# Patient Record
Sex: MALE | Race: WHITE | Employment: OTHER | URBAN - METROPOLITAN AREA
[De-identification: names, ages, dates, MRNs, and addresses within clinical notes are randomized per-mention and may not be internally consistent; named-entity substitution may affect disease eponyms.]

---

## 2018-05-03 ENCOUNTER — HOSPITAL ENCOUNTER (OUTPATIENT)
Dept: LAB | Age: 73
Discharge: HOME | End: 2018-05-03

## 2018-05-03 PROCEDURE — 88305 TISSUE EXAM BY PATHOLOGIST: CPT | Performed by: INTERNAL MEDICINE

## 2021-12-12 ENCOUNTER — APPOINTMENT (OUTPATIENT)
Dept: CT IMAGING | Age: 76
End: 2021-12-12
Payer: MEDICARE

## 2021-12-12 ENCOUNTER — HOSPITAL ENCOUNTER (EMERGENCY)
Age: 76
Discharge: HOME | End: 2021-12-12
Attending: EMERGENCY MEDICINE
Payer: MEDICARE

## 2021-12-12 VITALS
WEIGHT: 192.6 LBS | HEART RATE: 75 BPM | BODY MASS INDEX: 28.53 KG/M2 | SYSTOLIC BLOOD PRESSURE: 156 MMHG | HEIGHT: 69 IN | DIASTOLIC BLOOD PRESSURE: 70 MMHG | OXYGEN SATURATION: 99 % | RESPIRATION RATE: 18 BRPM | TEMPERATURE: 98 F

## 2021-12-12 DIAGNOSIS — R31.0 GROSS HEMATURIA: Primary | ICD-10-CM

## 2021-12-12 LAB
ALBUMIN SERPL-MCNC: 3.4 G/DL (ref 3.2–4.6)
ALBUMIN/GLOB SERPL: 0.9 {RATIO} (ref 1.2–3.5)
ALP SERPL-CCNC: 79 U/L (ref 50–136)
ALT SERPL-CCNC: 22 U/L (ref 12–65)
ANION GAP SERPL CALC-SCNC: 6 MMOL/L (ref 7–16)
APPEARANCE UR: ABNORMAL
AST SERPL-CCNC: 18 U/L (ref 15–37)
BACTERIA URNS QL MICRO: 0 /HPF
BILIRUB SERPL-MCNC: 0.4 MG/DL (ref 0.2–1.1)
BILIRUB UR QL: ABNORMAL
BUN SERPL-MCNC: 17 MG/DL (ref 8–23)
CALCIUM SERPL-MCNC: 9.1 MG/DL (ref 8.3–10.4)
CHLORIDE SERPL-SCNC: 105 MMOL/L (ref 98–107)
CO2 SERPL-SCNC: 28 MMOL/L (ref 21–32)
COLOR UR: ABNORMAL
CREAT SERPL-MCNC: 0.86 MG/DL (ref 0.8–1.5)
ERYTHROCYTE [DISTWIDTH] IN BLOOD BY AUTOMATED COUNT: 12.7 % (ref 11.9–14.6)
GLOBULIN SER CALC-MCNC: 3.9 G/DL (ref 2.3–3.5)
GLUCOSE SERPL-MCNC: 100 MG/DL (ref 65–100)
GLUCOSE UR STRIP.AUTO-MCNC: 100 MG/DL
HCT VFR BLD AUTO: 42.7 % (ref 41.1–50.3)
HGB BLD-MCNC: 14 G/DL (ref 13.6–17.2)
HGB UR QL STRIP: ABNORMAL
KETONES UR QL STRIP.AUTO: 40 MG/DL
LEUKOCYTE ESTERASE UR QL STRIP.AUTO: ABNORMAL
MCH RBC QN AUTO: 31.9 PG (ref 26.1–32.9)
MCHC RBC AUTO-ENTMCNC: 32.8 G/DL (ref 31.4–35)
MCV RBC AUTO: 97.3 FL (ref 79.6–97.8)
NITRITE UR QL STRIP.AUTO: POSITIVE
NRBC # BLD: 0 K/UL (ref 0–0.2)
OTHER OBSERVATIONS,UCOM: ABNORMAL
PH UR STRIP: 8.5 [PH] (ref 5–9)
PLATELET # BLD AUTO: 210 K/UL (ref 150–450)
PMV BLD AUTO: 8.9 FL (ref 9.4–12.3)
POTASSIUM SERPL-SCNC: 3.7 MMOL/L (ref 3.5–5.1)
PROT SERPL-MCNC: 7.3 G/DL (ref 6.3–8.2)
PROT UR STRIP-MCNC: ABNORMAL MG/DL
RBC # BLD AUTO: 4.39 M/UL (ref 4.23–5.6)
RBC #/AREA URNS HPF: >100 /HPF
SODIUM SERPL-SCNC: 139 MMOL/L (ref 136–145)
SP GR UR REFRACTOMETRY: 1.01 (ref 1–1.02)
UROBILINOGEN UR QL STRIP.AUTO: 4 EU/DL (ref 0.2–1)
WBC # BLD AUTO: 4.8 K/UL (ref 4.3–11.1)
WBC URNS QL MICRO: ABNORMAL /HPF

## 2021-12-12 PROCEDURE — 85027 COMPLETE CBC AUTOMATED: CPT

## 2021-12-12 PROCEDURE — 74177 CT ABD & PELVIS W/CONTRAST: CPT

## 2021-12-12 PROCEDURE — 99283 EMERGENCY DEPT VISIT LOW MDM: CPT

## 2021-12-12 PROCEDURE — 51798 US URINE CAPACITY MEASURE: CPT

## 2021-12-12 PROCEDURE — 74011000258 HC RX REV CODE- 258: Performed by: EMERGENCY MEDICINE

## 2021-12-12 PROCEDURE — 74011000636 HC RX REV CODE- 636: Performed by: EMERGENCY MEDICINE

## 2021-12-12 PROCEDURE — 80053 COMPREHEN METABOLIC PANEL: CPT

## 2021-12-12 PROCEDURE — 81003 URINALYSIS AUTO W/O SCOPE: CPT

## 2021-12-12 RX ORDER — SODIUM CHLORIDE 0.9 % (FLUSH) 0.9 %
5-40 SYRINGE (ML) INJECTION AS NEEDED
Status: DISCONTINUED | OUTPATIENT
Start: 2021-12-12 | End: 2021-12-13 | Stop reason: HOSPADM

## 2021-12-12 RX ORDER — TAMSULOSIN HYDROCHLORIDE 0.4 MG/1
0.4 CAPSULE ORAL DAILY
Qty: 15 CAPSULE | Refills: 0 | Status: SHIPPED | OUTPATIENT
Start: 2021-12-12 | End: 2021-12-27

## 2021-12-12 RX ORDER — SODIUM CHLORIDE 0.9 % (FLUSH) 0.9 %
10 SYRINGE (ML) INJECTION
Status: COMPLETED | OUTPATIENT
Start: 2021-12-12 | End: 2021-12-12

## 2021-12-12 RX ORDER — SODIUM CHLORIDE 0.9 % (FLUSH) 0.9 %
5-40 SYRINGE (ML) INJECTION EVERY 8 HOURS
Status: DISCONTINUED | OUTPATIENT
Start: 2021-12-12 | End: 2021-12-13 | Stop reason: HOSPADM

## 2021-12-12 RX ADMIN — Medication 10 ML: at 20:24

## 2021-12-12 RX ADMIN — IOPAMIDOL 100 ML: 755 INJECTION, SOLUTION INTRAVENOUS at 20:24

## 2021-12-12 RX ADMIN — SODIUM CHLORIDE 100 ML: 9 INJECTION, SOLUTION INTRAVENOUS at 20:22

## 2021-12-12 NOTE — ED PROVIDER NOTES
Mr. Treva Adrian is a 81-year-old male with a history of prostate cancer, and hyperlipidemia who presents to the emergency department with chief complaint of painless hematuria this morning. Patient states that this happened 1 time before 1 month ago after taking 1 meloxicam per day for 3 days for arthritis. Patient states that this morning he had bright red blood in his urine. Dates that he took an ibuprofen 1 time per day for the past 3 days. Denies dysuria, fever, flank pain, bladder pain, edema, melena, hematochezia, hemoptysis. Endorses one instance of urinary hesitancy today. The history is provided by the patient. No  was used. Blood in Urine  This is a recurrent problem. The current episode started more than 1 week ago. The problem occurs daily. The problem has not changed since onset. Pertinent negatives include no chest pain, no abdominal pain, no headaches and no shortness of breath. Nothing aggravates the symptoms. Nothing relieves the symptoms. Past Medical History:   Diagnosis Date    Arthritis     OA    GERD (gastroesophageal reflux disease)     takes a rare tums    Inguinal hernia     right    Mixed hyperlipidemia     Prostate cancer ([facility] [address] 3/2008    prostate    Skin cancer, basal cell     skin basal cell, shoulder and back       Past Surgical History:   Procedure Laterality Date    HX HERNIA REPAIR  6/4/2013    HX PROSTATECTOMY  2008    followed by radiation in 2012         Family History:   Problem Relation Age of Onset    Stroke Mother     Heart Disease Father        Social History     Socioeconomic History    Marital status:      Spouse name: Not on file    Number of children: Not on file    Years of education: Not on file    Highest education level: Not on file   Occupational History    Not on file   Tobacco Use    Smoking status: Never Smoker    Smokeless tobacco: Never Used   Substance and Sexual Activity    Alcohol use:  Yes Alcohol/week: 5.0 - 6.0 standard drinks     Types: 5 - 6 Standard drinks or equivalent per week    Drug use: No    Sexual activity: Not on file   Other Topics Concern    Not on file   Social History Narrative    Not on file     Social Determinants of Health     Financial Resource Strain:     Difficulty of Paying Living Expenses: Not on file   Food Insecurity:     Worried About Running Out of Food in the Last Year: Not on file    Tamir of Food in the Last Year: Not on file   Transportation Needs:     Lack of Transportation (Medical): Not on file    Lack of Transportation (Non-Medical): Not on file   Physical Activity:     Days of Exercise per Week: Not on file    Minutes of Exercise per Session: Not on file   Stress:     Feeling of Stress : Not on file   Social Connections:     Frequency of Communication with Friends and Family: Not on file    Frequency of Social Gatherings with Friends and Family: Not on file    Attends [employer] Services: Not on file    Active Member of [address] [place] [address] [email or web address] or Organizations: Not on file    Attends Club or Organization Meetings: Not on file    Marital Status: Not on file   Intimate Partner Violence:     Fear of Current or Ex-Partner: Not on file    Emotionally Abused: Not on file    Physically Abused: Not on file    Sexually Abused: Not on file   Housing Stability:     Unable to Pay for Housing in the Last Year: Not on file    Number of [place] in the Last Year: Not on file    Unstable Housing in the Last Year: Not on file         ALLERGIES: Patient has no known allergies. Review of Systems   Constitutional: Negative for chills, diaphoresis, fatigue and fever. Respiratory: Negative for cough and shortness of breath. Cardiovascular: Negative. Negative for chest pain and leg swelling. Gastrointestinal: Negative for abdominal pain, blood in stool, constipation, diarrhea, nausea and vomiting. Genitourinary: Positive for decreased urine volume and hematuria. Negative for dysuria, enuresis, flank pain, frequency, penile pain and urgency. Neurological: Negative for dizziness, weakness and headaches. All other systems reviewed and are negative. Vitals:    12/12/21 1824   BP: (!) 169/77   Pulse: 71   Resp: 18   Temp: 98 °F (36.7 °C)   SpO2: 99%   Weight: 87.4 kg (192 lb 9.6 oz)   Height: 5' 9\" (1.753 m)            Physical Exam  Vitals and nursing note reviewed. Constitutional:       General: He is not in acute distress. Appearance: Normal appearance. He is normal weight. He is not ill-appearing, toxic-appearing or diaphoretic. HENT:      Head: Normocephalic and atraumatic. Mouth/Throat:      Mouth: Mucous membranes are moist.      Pharynx: No oropharyngeal exudate or posterior oropharyngeal erythema. Eyes:      General: No scleral icterus. Extraocular Movements: Extraocular movements intact. Pupils: Pupils are equal, round, and reactive to light. Cardiovascular:      Rate and Rhythm: Normal rate and regular rhythm. Pulses: Normal pulses. Heart sounds: Normal heart sounds. Pulmonary:      Effort: Pulmonary effort is normal.      Breath sounds: Normal breath sounds. Abdominal:      General: Abdomen is flat. Bowel sounds are normal.      Palpations: Abdomen is soft. Tenderness: There is no abdominal tenderness. There is no right CVA tenderness, left CVA tenderness, guarding or rebound. Musculoskeletal:      Cervical back: Neck supple. Right lower leg: No edema. Left lower leg: No edema. Skin:     General: Skin is warm and dry. Coloration: Skin is not jaundiced or pale. Findings: No bruising, erythema or rash. Neurological:      Mental Status: He is alert and oriented to person, place, and time. Motor: No weakness.    Psychiatric:         Mood and Affect: Mood normal.          MDM  Number of Diagnoses or Management Options  Gross hematuria  Diagnosis management comments: 19-year-old male with history of prostate cancer presenting for painless hematuria today. Patient states he has had one other instance of painless hematuria 1 month ago, did not seek medical attention. Vital signs reviewed. Patient stable. Blood pressure elevated but patient is asymptomatic. Sitting on the edge of the bed comfortably. DDX: Bladder carcinoma, cystitis, interstitial nephritis, renal cell carcinoma, renal calculi, transitional cell carcinoma,hemolytic anemia. Urinalysis, CBC, CMP labs ordered. Labs Reviewed  URINALYSIS W/ RFLX MICROSCOPIC - Abnormal; Notable for the following components:     Protein                         (*)                  Glucose                       100 (*)                Ketone                        40 (*)                 Bilirubin                     LARGE (*)               Blood                         LARGE (*)               Urobilinogen                  4.0 (*)                Nitrites                      Positive (*)               Leukocyte Esterase            LARGE (*)            All other components within normal limits  CBC W/O DIFF - Abnormal; Notable for the following components:     MPV                           8.9 (*)             All other components within normal limits  METABOLIC PANEL, COMPREHENSIVE - Abnormal; Notable for the following components:     Anion gap                     6 (*)                  Globulin                      3.9 (*)                A-G Ratio                     0.9 (*)             All other components within normal limits    Physical exam benign. Based on history and physical, I feel a CT abdomen pelvis with IV contrast is warranted at this time. At 2100 patient was able to urinate. Patient stated he had difficulty getting urine stream started, but stream seemed normal.  Patient states he visualized some clots in his urine. Nurses doing a bladder scan to assess urine retention in the bladder. .    Pending CT abdomen pelvis reading by the radiologist, I will contact the PA for urology via [employer] consult/referral.   Perfect served Dr. Liborio Shea with urology with detailed history and CT report. Dr. Kaleb Reynolds office will contact the patient for follow-up in the office. She is not on any blood thinners at this time that need to be held. Additionally incidental finding of gastric pylorus intussusception in the proximal duodenum on CT scan. Patient currently not having any upper abdominal complaints, tenderness, vomiting. We will have patient follow-up with GI for direct visualization. Given patient strict return to ER instructions. Patient also discharged with a prescription for Flomax    Radiologist called report to me personally. Patient discharge delayed due to backup in radiology and radiology reading. Vane Springer; 12/12/2021 @10:34 PM Voice dictation software was used during the making of this note. This software is not perfect and grammatical and other typographical errors may be present. This note has not been proofread for errors.  ===================================================================         Amount and/or Complexity of Data Reviewed  Clinical lab tests: ordered and reviewed  Tests in the radiology section of CPT®: ordered and reviewed  Review and summarize past medical records: yes  Discuss the patient with other providers: yes (Discussed this patient with the radiologist via phone. Discussed this patient with urology via perfect serve)  Independent visualization of images, tracings, or specimens: yes    Risk of Complications, Morbidity, and/or Mortality  Presenting problems: high  Diagnostic procedures: high  Management options: high  General comments: Patient will be discharged home with strict return to the emergency department instructions. CT image findings reported from radiologist directly to me via telephone. Urology notified over perfect dee, Dr. Kaleb Reynolds office will contact patient. Expressed importance to the patient that he need to follow-up with urology even if he is no longer having blood in his urine. Conveyed  importance that he follow-up with GI. Patient Progress  Patient progress: stable    ED Course as of 12/12/21 2228   Sun Dec 12, 2021   2059 Post void bladder scan bedside: Average 75 mL. [JG]   2107 Patient's blood pressure: 152/72. Patient stable. [JG]   2227 CT ABD PELV W CONT  FINDINGS:     Lung Bases: Partially imaged lung bases are clear. No pericardial effusion.     Liver: Unremarkable.     Gallbladder/Biliary: Gallbladder contracted otherwise unremarkable. No  intrahepatic or extrahepatic ductal dilatation.     Pancreas: Normal. No main ductal dilatation.     Spleen: Normal     Adrenal glands: Normal     Kidneys/Ureters: Symmetric bilateral enhancement. No hydronephrosis. No  suspicious lesions.        Bladder: Hyperattenuating material and/or soft tissue within the dependent  bladder.     Reproduction:Post prostatectomy. No soft tissue attenuation within the resection  bed.     Bowel: Mild gastric distention with intussusception of the gastric pylorus into  the proximal duodenum. Small and large bowel are normal caliber.     Other: No enlarged adenopathy or ascites.     Vessels: Atherosclerotic calcifications without significant vessel narrowing.     Musculoskeletal: No acute or aggressive osseous abnormalities. The soft tissues  are unremarkable.     IMPRESSION  1. Hyperattenuating material and/or soft tissue within the dependent bladder  which should be correlated with direct visualization.     2.  Mild gastric distention with intussusception of the gastric pylorus into the  proximal duodenum without liliane outlet obstruction which may be incidental  finding. Although no discrete mass is visualized cannot exclude a possible lead  point, recommend GI follow-up for possible direct visualization with endoscopy.  [JG]      ED Course User Index  [JG] Vane Blair Procedures

## 2021-12-13 NOTE — ED NOTES
I have reviewed discharge instructions with the patient. The patient verbalized understanding. Patient left ED via Discharge Method: ambulatory to Home with self. Opportunity for questions and clarification provided. Patient given 0 scripts. To continue your aftercare when you leave the hospital, you may receive an automated call from our care team to check in on how you are doing. This is a free service and part of our promise to provide the best care and service to meet your aftercare needs.  If you have questions, or wish to unsubscribe from this service please call [phone]. Thank you for Choosing our [facility] Emergency Department.

## 2021-12-13 NOTE — DISCHARGE INSTRUCTIONS
Should receive a phone call from urology within the next day or 2. Not hear from urology call their office information provided in the discharge papers. Is important that you follow-up with the urologist even if you stop having blood in your urine. You need to be evaluated by them. Prompt return to the emergency department you are unable to urinate, have bladder or pelvic pain, fever or flank pain, or any of the symptoms that we discussed. Do not take NSAIDs for your arthritic pain. Switch to Tylenol. You had an incidental finding on your CAT scan requires a follow-up with GI for direct visualization.   Return to the emergency department if you have abdominal pain, vomiting,

## 2022-01-07 PROCEDURE — 88112 CYTOPATH CELL ENHANCE TECH: CPT

## 2022-01-10 ENCOUNTER — HOSPITAL ENCOUNTER (OUTPATIENT)
Dept: LAB | Age: 77
Discharge: HOME | End: 2022-01-10

## 2022-01-11 NOTE — PROGRESS NOTES
Mr. Osmar Hightower, your urine cytology showed NO cancer cells. We will leave your follow up open ended for now. Please call me if the blood in your urine returns. Thanks!   Dr. Johann Higgins

## 2022-02-09 ENCOUNTER — ANESTHESIA EVENT (OUTPATIENT)
Dept: SURGERY | Age: 77
End: 2022-02-09
Payer: MEDICARE

## 2022-02-10 ENCOUNTER — HOSPITAL ENCOUNTER (OUTPATIENT)
Age: 77
Setting detail: OUTPATIENT SURGERY
Discharge: HOME | End: 2022-02-10
Attending: ORTHOPAEDIC SURGERY | Admitting: ORTHOPAEDIC SURGERY
Payer: MEDICARE

## 2022-02-10 ENCOUNTER — ANESTHESIA (OUTPATIENT)
Dept: SURGERY | Age: 77
End: 2022-02-10
Payer: MEDICARE

## 2022-02-10 VITALS
RESPIRATION RATE: 16 BRPM | BODY MASS INDEX: 28.06 KG/M2 | OXYGEN SATURATION: 100 % | TEMPERATURE: 98.1 F | DIASTOLIC BLOOD PRESSURE: 71 MMHG | HEART RATE: 60 BPM | WEIGHT: 190 LBS | SYSTOLIC BLOOD PRESSURE: 160 MMHG

## 2022-02-10 DIAGNOSIS — G89.18 ACUTE POST-OPERATIVE PAIN: Primary | ICD-10-CM

## 2022-02-10 PROCEDURE — 28289 CORRJ HALUX RIGDUS W/O IMPLT: CPT | Performed by: ORTHOPAEDIC SURGERY

## 2022-02-10 PROCEDURE — 74011250636 HC RX REV CODE- 250/636: Performed by: NURSE PRACTITIONER

## 2022-02-10 PROCEDURE — 76060000032 HC ANESTHESIA 0.5 TO 1 HR: Performed by: ORTHOPAEDIC SURGERY

## 2022-02-10 PROCEDURE — 77030040922 HC BLNKT HYPOTHRM STRY -A: Performed by: ANESTHESIOLOGY

## 2022-02-10 PROCEDURE — 76010000154 HC OR TIME FIRST 0.5 HR: Performed by: ORTHOPAEDIC SURGERY

## 2022-02-10 PROCEDURE — 74011250636 HC RX REV CODE- 250/636: Performed by: NURSE ANESTHETIST, CERTIFIED REGISTERED

## 2022-02-10 PROCEDURE — 77030000032 HC CUF TRNQT ZIMM -B: Performed by: ORTHOPAEDIC SURGERY

## 2022-02-10 PROCEDURE — 76210000063 HC OR PH I REC FIRST 0.5 HR: Performed by: ORTHOPAEDIC SURGERY

## 2022-02-10 PROCEDURE — 74011000250 HC RX REV CODE- 250: Performed by: ORTHOPAEDIC SURGERY

## 2022-02-10 PROCEDURE — 28289 CORRJ HALUX RIGDUS W/O IMPLT: CPT | Performed by: NURSE PRACTITIONER

## 2022-02-10 PROCEDURE — 74011250637 HC RX REV CODE- 250/637: Performed by: ANESTHESIOLOGY

## 2022-02-10 PROCEDURE — 74011000250 HC RX REV CODE- 250: Performed by: NURSE ANESTHETIST, CERTIFIED REGISTERED

## 2022-02-10 PROCEDURE — 76210000021 HC REC RM PH II 0.5 TO 1 HR: Performed by: ORTHOPAEDIC SURGERY

## 2022-02-10 PROCEDURE — 74011250636 HC RX REV CODE- 250/636: Performed by: ANESTHESIOLOGY

## 2022-02-10 PROCEDURE — 77030002982 HC SUT POLYSRB J&J -A: Performed by: ORTHOPAEDIC SURGERY

## 2022-02-10 PROCEDURE — 77030006788 HC BLD SAW OSC STRY -B: Performed by: ORTHOPAEDIC SURGERY

## 2022-02-10 PROCEDURE — 2709999900 HC NON-CHARGEABLE SUPPLY: Performed by: ORTHOPAEDIC SURGERY

## 2022-02-10 PROCEDURE — 77030002916 HC SUT ETHLN J&J -A: Performed by: ORTHOPAEDIC SURGERY

## 2022-02-10 RX ORDER — SODIUM CHLORIDE, SODIUM LACTATE, POTASSIUM CHLORIDE, CALCIUM CHLORIDE 600; 310; 30; 20 MG/100ML; MG/100ML; MG/100ML; MG/100ML
75 INJECTION, SOLUTION INTRAVENOUS CONTINUOUS
Status: DISCONTINUED | OUTPATIENT
Start: 2022-02-10 | End: 2022-02-10 | Stop reason: HOSPADM

## 2022-02-10 RX ORDER — MIDAZOLAM HYDROCHLORIDE 1 MG/ML
4 INJECTION, SOLUTION INTRAMUSCULAR; INTRAVENOUS ONCE
Status: DISCONTINUED | OUTPATIENT
Start: 2022-02-10 | End: 2022-02-10 | Stop reason: HOSPADM

## 2022-02-10 RX ORDER — LIDOCAINE HYDROCHLORIDE 10 MG/ML
0.1 INJECTION INFILTRATION; PERINEURAL AS NEEDED
Status: DISCONTINUED | OUTPATIENT
Start: 2022-02-10 | End: 2022-02-10 | Stop reason: HOSPADM

## 2022-02-10 RX ORDER — MIDAZOLAM HYDROCHLORIDE 1 MG/ML
2 INJECTION, SOLUTION INTRAMUSCULAR; INTRAVENOUS
Status: DISCONTINUED | OUTPATIENT
Start: 2022-02-10 | End: 2022-02-10 | Stop reason: HOSPADM

## 2022-02-10 RX ORDER — CEFAZOLIN SODIUM/WATER 2 G/20 ML
2 SYRINGE (ML) INTRAVENOUS ONCE
Status: COMPLETED | OUTPATIENT
Start: 2022-02-10 | End: 2022-02-10

## 2022-02-10 RX ORDER — PROPOFOL 10 MG/ML
INJECTION, EMULSION INTRAVENOUS AS NEEDED
Status: DISCONTINUED | OUTPATIENT
Start: 2022-02-10 | End: 2022-02-10 | Stop reason: HOSPADM

## 2022-02-10 RX ORDER — HYDROCODONE BITARTRATE AND ACETAMINOPHEN 5; 325 MG/1; MG/1
2 TABLET ORAL AS NEEDED
Status: DISCONTINUED | OUTPATIENT
Start: 2022-02-10 | End: 2022-02-10 | Stop reason: HOSPADM

## 2022-02-10 RX ORDER — LIDOCAINE HYDROCHLORIDE 20 MG/ML
INJECTION, SOLUTION EPIDURAL; INFILTRATION; INTRACAUDAL; PERINEURAL AS NEEDED
Status: DISCONTINUED | OUTPATIENT
Start: 2022-02-10 | End: 2022-02-10 | Stop reason: HOSPADM

## 2022-02-10 RX ORDER — FENTANYL CITRATE 50 UG/ML
100 INJECTION, SOLUTION INTRAMUSCULAR; INTRAVENOUS ONCE
Status: DISCONTINUED | OUTPATIENT
Start: 2022-02-10 | End: 2022-02-10 | Stop reason: HOSPADM

## 2022-02-10 RX ORDER — SODIUM CHLORIDE 0.9 % (FLUSH) 0.9 %
5-40 SYRINGE (ML) INJECTION AS NEEDED
Status: DISCONTINUED | OUTPATIENT
Start: 2022-02-10 | End: 2022-02-10 | Stop reason: HOSPADM

## 2022-02-10 RX ORDER — OXYCODONE HYDROCHLORIDE 5 MG/1
5 TABLET ORAL
Qty: 20 TABLET | Refills: 0 | Status: SHIPPED | OUTPATIENT
Start: 2022-02-10 | End: 2022-02-15

## 2022-02-10 RX ORDER — HYDROMORPHONE HYDROCHLORIDE 2 MG/ML
0.5 INJECTION, SOLUTION INTRAMUSCULAR; INTRAVENOUS; SUBCUTANEOUS
Status: DISCONTINUED | OUTPATIENT
Start: 2022-02-10 | End: 2022-02-10 | Stop reason: HOSPADM

## 2022-02-10 RX ORDER — BUPIVACAINE HYDROCHLORIDE 5 MG/ML
INJECTION, SOLUTION EPIDURAL; INTRACAUDAL AS NEEDED
Status: DISCONTINUED | OUTPATIENT
Start: 2022-02-10 | End: 2022-02-10 | Stop reason: HOSPADM

## 2022-02-10 RX ORDER — CEPHALEXIN 500 MG/1
500 CAPSULE ORAL 4 TIMES DAILY
Qty: 12 CAPSULE | Refills: 0 | Status: SHIPPED | OUTPATIENT
Start: 2022-02-10

## 2022-02-10 RX ORDER — SODIUM CHLORIDE 0.9 % (FLUSH) 0.9 %
5-40 SYRINGE (ML) INJECTION EVERY 8 HOURS
Status: DISCONTINUED | OUTPATIENT
Start: 2022-02-10 | End: 2022-02-10 | Stop reason: HOSPADM

## 2022-02-10 RX ORDER — PROPOFOL 10 MG/ML
INJECTION, EMULSION INTRAVENOUS
Status: DISCONTINUED | OUTPATIENT
Start: 2022-02-10 | End: 2022-02-10 | Stop reason: HOSPADM

## 2022-02-10 RX ORDER — OXYCODONE HYDROCHLORIDE 5 MG/1
5 TABLET ORAL
Status: DISCONTINUED | OUTPATIENT
Start: 2022-02-10 | End: 2022-02-10 | Stop reason: HOSPADM

## 2022-02-10 RX ADMIN — PROPOFOL 40 MG: 10 INJECTION, EMULSION INTRAVENOUS at 07:46

## 2022-02-10 RX ADMIN — LIDOCAINE HYDROCHLORIDE 40 MG: 20 INJECTION, SOLUTION EPIDURAL; INFILTRATION; INTRACAUDAL; PERINEURAL at 07:42

## 2022-02-10 RX ADMIN — SODIUM CHLORIDE, SODIUM LACTATE, POTASSIUM CHLORIDE, AND CALCIUM CHLORIDE 75 ML/HR: 600; 310; 30; 20 INJECTION, SOLUTION INTRAVENOUS at 06:14

## 2022-02-10 RX ADMIN — PROPOFOL 50 MG: 10 INJECTION, EMULSION INTRAVENOUS at 07:42

## 2022-02-10 RX ADMIN — PROPOFOL 100 MCG/KG/MIN: 10 INJECTION, EMULSION INTRAVENOUS at 07:44

## 2022-02-10 RX ADMIN — HYDROCODONE BITARTRATE AND ACETAMINOPHEN 2 TABLET: 5; 325 TABLET ORAL at 08:18

## 2022-02-10 RX ADMIN — Medication 2 G: at 07:32

## 2022-02-10 NOTE — ANESTHESIA PREPROCEDURE EVALUATION
Anesthetic History   No history of anesthetic complications            Review of Systems / Medical History  Patient summary reviewed and pertinent labs reviewed    Pulmonary  Within defined limits                 Neuro/Psych   Within defined limits           Cardiovascular              Hyperlipidemia    Exercise tolerance: >4 METS     GI/Hepatic/Renal     GERD: well controlled           Endo/Other        Arthritis and cancer (prostate)     Other Findings   Comments:            Physical Exam    Airway  Mallampati: II  TM Distance: 4 - 6 cm  Neck ROM: normal range of motion   Mouth opening: Normal     Cardiovascular  Regular rate and rhythm,  S1 and S2 normal,  no murmur, click, rub, or gallop             Dental    Dentition: Implants     Pulmonary  Breath sounds clear to auscultation               Abdominal  GI exam deferred       Other Findings            Anesthetic Plan    ASA: 2  Anesthesia type: total IV anesthesia and general - backup          Induction: Intravenous  Anesthetic plan and risks discussed with: Patient and Spouse      No block

## 2022-02-10 NOTE — OP NOTES
FULL OP NOTE    PATIENT NAME: Murray Christina  MRN: [id number]    DATE OF SURGERY: 2/10/2022    PREOPERATIVE DIAGNOSIS: Right foot pain [M79.671]  Hallux rigidus of right foot [M20.21]      POSTOPERATIVE DIAGNOSIS: Right foot pain [M79.671]  Hallux rigidus of right foot [M20.21]      PROCEDURE: Right first MTP cheilectomy, 79541    SURGEON: Kristie Salazar MD    ASSISTANT: Dorita Ríos NP  An assistant was required for positioning, retraction, and wound closure for this procedure. HARDWARE: * No implants in log *  INDICATIONS: This patient is a 78y.o. year old male with a history of Right foot pain [M79.671]  Hallux rigidus of right foot [M20.21] who has failed conservative therapy and desires surgical treatment. Risks and benefits of the procedure including, but not limited to, anesthetic complications as well as surgical complications including damage to nerves and blood vessels, risk of infection, risk of incomplete pain relief, risk of malunion, nonunion and need for additional surgery have been discussed with the patient who wishes to proceed. PROCEDURE IN DETAIL: A time out was done to confirm the operating procedure, surgeon, patient and site. During a preop surgical timeout the right lower extremity was identified as correct surgical site and prepped and draped in a standard sterile fashion using ChloraPrep solution. A field block was obtained with 0.5% plain Marcaine. A dorsal approach the first MTP joint was an open followed by capsulotomy. Osteophytes removed from the base the proximal phalanx using a rongeur. A sagittal saw was used to remove osteophytes from the first metatarsal head as well. Both gutters were decompressed using a rongeur of synovitis and bone spurs. The wound was then irrigated and closed using Vicryl and the capsule followed by Monocryl nylon sutures on the skin. Sterile dressings were applied. Anesthesia was continued.   The patient was transferred back to recovery bed.  He was taken to recovery in satisfactory condition. Appear to tolerate the procedure well. There were no apparent surgical or anesthetic complications. All needle and sponge counts were correct. TOURNIQUET TIME: Approx 12 minutes. SPECIMENS: none    ESTIMATED BLOOD LOSS: min mL.

## 2022-02-10 NOTE — DISCHARGE INSTRUCTIONS
INSTRUCTIONS FOLLOWING FOOT SURGERY    ACTIVITY  Elevate foot. No Ice    Protected partial weight bearing on the heel only as tolerated in post op shoe after full sensation returns. Blood clot prevention:  As instructed by Dr Horacio Souza: Take one 325mg aspirin daily if okay with your medical doctor and you have no GI ulcer. Get up and out of bed frequently. While in bed move the legs as much as possible. DRESSING CARE Keep dry and in place until follow up appointment. Cover with cast bag or plastic bag when showering. Let the office know if dressing gets saturated with water. Don't put anything into the splint to relieve itching etc.     CALL YOUR DOCTOR IF YOU HAVE  Excessive bleeding that does not stop after holding mild pressure over the area. Temperature of 101 degrees or above. Redness, excessive swelling or bruising, and/or green or yellow, smelly discharge from incision. Loss of sensation - cold, white or blue toes. DIET  Day of Surgery: Clear liquids until no nausea or vomiting; then light, bland diet (Baked chicken, plain rice, grits, scrambled egg, toast). Nothing Greasy, fried or spicy today  Advance to regular diet on second day, unless your doctor orders otherwise. PAIN  Take pain medications as directed by your doctor. Call your doctor if pain is NOT relieved by medication. MEDICATION INTERACTION:  During your procedure you potentially received a medication or medications which may reduce the effectiveness of oral contraceptives. Please consider other forms of contraception for 1 month following your procedure if you are currently using oral contraceptives as your primary form of birth control.  In addition to this, we recommend continuing your oral contraceptive as prescribed, unless otherwise instructed by your physician, during this time    After general anesthesia or intravenous sedation, for 24 hours or while taking prescription Narcotics:  · Limit your activities  · A responsible adult needs to be with you for the next 24 hours  · Do not drive and operate hazardous machinery  · Do not make important personal or business decisions  · Do not drink alcoholic beverages  · If you have not urinated within 8 hours after discharge, and you are experiencing discomfort from urinary retention, please go to the nearest ED. · If you have sleep apnea and have a CPAP machine, please use it for all naps and sleeping. · Please use caution when taking narcotics and any of your home medications that may cause drowsiness. *  Please give a list of your current medications to your Primary Care Provider. *  Please update this list whenever your medications are discontinued, doses are      changed, or new medications (including over-the-counter products) are added. *  Please carry medication information at all times in case of emergency situations. These are general instructions for a healthy lifestyle:  No smoking/ No tobacco products/ Avoid exposure to second hand smoke  Surgeon General's Warning:  Quitting smoking now greatly reduces serious risk to your health. Obesity, smoking, and sedentary lifestyle greatly increases your risk for illness  A healthy diet, regular physical exercise & weight monitoring are important for maintaining a healthy lifestyle    You may be retaining fluid if you have a history of heart failure or if you experience any of the following symptoms:  Weight gain of 3 pounds or more overnight or 5 pounds in a week, increased swelling in our hands or feet or shortness of breath while lying flat in bed. Please call your doctor as soon as you notice any of these symptoms; do not wait until your next office visit. Learning About How to Use Crutches  Your Care Instructions  Crutches can help you walk when you have an injured hip, leg, knee, ankle, or foot. Your doctor will tell you how much weight--if any--you can put on your leg. Be sure your crutches fit you.  When you stand up in your normal posture, there should be space for two or three fingers between the top of the crutch and your armpit. When you let your hands hang down, the hand  should be at your wrists. When you put your hands on the hand , your elbows should be slightly bent. To stay safe when using crutches:  · Look straight ahead, not down at your feet. · Clear away small rugs, cords, or anything else that could cause you to trip, slip, or fall. · Be very careful around pets and small children. They can get in your path when you least expect it. · Be sure the rubber tips on your crutches are clean and in good condition to help prevent slipping. · Avoid slick conditions, such as wet floors and snowy or icy driveways. In bad weather, be especially careful on curbs and steps. How to use crutches  Getting ready to walk    1. Bend your elbows slightly. Press the padded top parts of the crutches against your sides, under your armpits. 2. If you have been told not to put any weight on your injured leg, keep that leg bent and off the ground. Walking with crutches    1. Put both crutches about 12 inches in front of you. 2. Put your weight on the handgrips, not on the pads under your arms. (Constant pressure against your underarms can cause numbness.) Swing your body forward. (If you have been told not to put any weight on your injured leg, keep that leg bent and off the ground.)  3. To complete the step, put your weight on the strong leg. 4. Move your crutches about 12 inches in front of you, and start the next step. 5. When you're confident using the crutches, you can move the crutches and your injured leg at the same time. Then push straight down on the crutches as you step past the crutches with your strong leg, as you would in normal walking. 6. Take small steps. 7. Use ramps and elevators when you can. Sitting down    1. To sit, back up to the chair.  Use one hand to hold both crutches by the handgrips, beside your injured leg. With the other hand, hold onto the seat and slowly lower yourself onto the chair. 2. Lay the crutches on the ground near your chair. If you prop them up, they may fall over. Getting up from a chair    1. To get up from a chair,  the crutches and put them in one hand beside your injured leg. 2. Put your weight on the handgrips of the crutches and on your strong leg to stand up. Walking up stairs    1. To go up stairs, step up with your strong leg and then bring the crutches and your injured leg to the upper step. 2. For stairs that have handrails: Put both crutches under the arm opposite the handrail. Use the hand opposite the handrail to hold both crutches by the handgrips. 3. Hold onto the handrail as you go up. Put your strong leg on the step first when you go up. Walking down stairs    1. To go down stairs, put your crutches and injured leg on the lower step. 2. Bring your strong leg to the lower step. This saying may help you remember: \"Up with the good, down with the bad. \"  3. For stairs that have handrails: Put both crutches under the arm opposite the handrail. Use the hand opposite the handrail to hold both crutches by the handgrips. Hold onto the handrail as you go down. Follow the same process you use for stairs: Lead with your crutches and injured leg on the way down.

## 2022-02-10 NOTE — ANESTHESIA POSTPROCEDURE EVALUATION
Procedure(s):  RIGHT FIRST METATARSOPHALANGEAL CHEILECTOMY . No value filed. Anesthesia Post Evaluation      Multimodal analgesia: multimodal analgesia used between 6 hours prior to anesthesia start to PACU discharge  Patient location during evaluation: PACU  Patient participation: complete - patient participated  Level of consciousness: awake and alert  Pain score: 0  Pain management: satisfactory to patient  Airway patency: patent  Anesthetic complications: no  Cardiovascular status: acceptable and hemodynamically stable  Respiratory status: acceptable and spontaneous ventilation  Hydration status: acceptable  Post anesthesia nausea and vomiting:  none  Final Post Anesthesia Temperature Assessment:  Normothermia (36.0-37.5 degrees C)      INITIAL Post-op Vital signs:   Vitals Value Taken Time   /67 02/10/22 0821   Temp 36.7 °C (98.1 °F) 02/10/22 0811   Pulse 59 02/10/22 0821   Resp 16 02/10/22 0821   SpO2 99 % 02/10/22 0821   Vitals shown include unvalidated device data.

## 2022-02-10 NOTE — H&P
Outpatient Surgery History and Physical:  José Villanueva was seen and examined. CHIEF COMPLAINT:   Right foot. PE:     Visit Vitals  BP (!) 145/78 (BP 1 Location: Right arm, BP Patient Position: Sitting)   Pulse 70   Temp 97.9 °F (36.6 °C)   Resp 18   Wt 190 lb (86.2 kg)   SpO2 97%   BMI 28.06 kg/m²       Heart:   Regular rhythm      Lungs:  Are clear      Past Medical History:    Patient Active Problem List    Diagnosis    Prediabetes    Onychomycosis    Blepharoptosis, mild    LBP (low back pain)    Eyelid twitch    ED (erectile dysfunction)    Arthritis    Neck pain    GERD (gastroesophageal reflux disease)     takes a rare tums      Skin cancer, basal cell     skin basal cell, shoulder and back      Mixed hyperlipidemia    Prostate cancer [facility])     prostate         Surgical History:   Past Surgical History:   Procedure Laterality Date    HX HEENT  01/2022    implant/dental    HX HERNIA REPAIR  6/4/2013    HX PROSTATECTOMY  2008    followed by radiation in 2012       Social History: Patient  reports that he has never smoked. He has never used smokeless tobacco. He reports current alcohol use of about 5.0 - 6.0 standard drinks of alcohol per week. He reports that he does not use drugs. Family History:   Family History   Problem Relation Age of Onset    Stroke Mother     Heart Disease Father        Allergies: Reviewed per EMR  No Known Allergies    Medications:    No current facility-administered medications on file prior to encounter. Current Outpatient Medications on File Prior to Encounter   Medication Sig    DOCOSAHEXANOIC ACID/EPA (FISH OIL PO) Take 1 Tab by mouth daily.  simvastatin (ZOCOR) 20 mg tablet Take 1 Tab by mouth nightly.  GLUCOSAMINE HCL/CHONDR MADRIGAL A NA (GLUCOSAMINE-CHONDROITIN) 750-600 mg tab Take  by mouth.  calcium carbonate (TUMS) 200 mg calcium (500 mg) Chew Take 2 Tabs by mouth as needed.     acetaminophen (TYLENOL EXTRA STRENGTH) 500 mg tablet Take 1,000 mg by mouth every six (6) hours as needed.  tadalafil (CIALIS) 5 mg tablet Take 1 Tab by mouth as needed. The surgery is planned for the RIGHT FIRST METATARSOPHALANGEAL CHEILECTOMY . History and physical has been reviewed. The patient has been examined. There have been no significant clinical changes since the completion of the originally dated History and Physical.  Patient identified by surgeon; surgical site was confirmed by patient and surgeon. The patient is here today for outpatient surgery. I have examined the patient, no changes are noted in the patient's medical status. Necessity for the procedure/care is still present and the history and physical above is current. See the office notes for the full long term history of the problem. Please see the recent office notes for the musculoskeletal examination.     Signed By: Tanja Khan NP     February 10, 2022 6:57 AM

## 2022-02-10 NOTE — BRIEF OP NOTE
Brief Postoperative Note    Patient: Delia Sharma  YOB: 1945  MRN: [id number]    Date of Procedure: 2/10/2022     Pre-Op Diagnosis: Right foot pain [M79.671]  Hallux rigidus of right foot [M20.21]    Post-Op Diagnosis: Same as preoperative diagnosis.       Procedure(s):  RIGHT FIRST METATARSOPHALANGEAL CHEILECTOMY     Surgeon(s):  Carola Cruz MD    Surgical Assistant: Nurse Practitioner: Cozette Min, Noberto Mcardle, RN    Anesthesia: MAC     Estimated Blood Loss (mL): Minimal    Complications: None    Specimens: * No specimens in log *     Implants: * No implants in log *    Drains: * No LDAs found *    Findings:     Electronically Signed by Jacques Salinas MD on 2/10/2022 at 8:07 AM

## 2022-07-13 DIAGNOSIS — M17.11 PRIMARY OSTEOARTHRITIS OF RIGHT KNEE: Primary | ICD-10-CM

## 2022-07-21 DIAGNOSIS — M17.11 PRIMARY OSTEOARTHRITIS OF RIGHT KNEE: ICD-10-CM

## 2022-07-25 RX ORDER — SODIUM CHLORIDE 9 MG/ML
INJECTION, SOLUTION INTRAVENOUS PRN
Status: CANCELLED | OUTPATIENT
Start: 2022-07-25

## 2022-07-25 RX ORDER — SODIUM CHLORIDE 0.9 % (FLUSH) 0.9 %
5-40 SYRINGE (ML) INJECTION PRN
Status: CANCELLED | OUTPATIENT
Start: 2022-07-25

## 2022-07-25 RX ORDER — ACETAMINOPHEN 500 MG
1000 TABLET ORAL ONCE
Status: CANCELLED | OUTPATIENT
Start: 2022-07-25 | End: 2022-07-25

## 2022-07-25 RX ORDER — SODIUM CHLORIDE 0.9 % (FLUSH) 0.9 %
5-40 SYRINGE (ML) INJECTION EVERY 12 HOURS SCHEDULED
Status: CANCELLED | OUTPATIENT
Start: 2022-07-25

## 2022-07-26 ENCOUNTER — TELEPHONE (OUTPATIENT)
Dept: ORTHOPEDIC SURGERY | Age: 77
End: 2022-07-26

## 2022-07-26 ENCOUNTER — HOSPITAL ENCOUNTER (OUTPATIENT)
Dept: REHABILITATION | Age: 77
Discharge: HOME | End: 2022-07-29
Payer: MEDICARE

## 2022-07-26 ENCOUNTER — HOSPITAL ENCOUNTER (OUTPATIENT)
Dept: SURGERY | Age: 77
Discharge: HOME | End: 2022-07-29
Payer: MEDICARE

## 2022-07-26 VITALS
RESPIRATION RATE: 16 BRPM | WEIGHT: 190.4 LBS | SYSTOLIC BLOOD PRESSURE: 135 MMHG | HEART RATE: 64 BPM | BODY MASS INDEX: 28.2 KG/M2 | OXYGEN SATURATION: 98 % | HEIGHT: 69 IN | DIASTOLIC BLOOD PRESSURE: 72 MMHG | TEMPERATURE: 98.1 F

## 2022-07-26 DIAGNOSIS — R06.83 SNORING: ICD-10-CM

## 2022-07-26 DIAGNOSIS — M17.11 ARTHRITIS OF KNEE, RIGHT: Primary | ICD-10-CM

## 2022-07-26 LAB
ANION GAP SERPL CALC-SCNC: 5 MMOL/L (ref 7–16)
APTT PPP: 28.6 SEC (ref 24.1–35.1)
BACTERIA SPEC CULT: NORMAL
BASOPHILS # BLD: 0 K/UL (ref 0–0.2)
BASOPHILS NFR BLD: 1 % (ref 0–2)
BUN SERPL-MCNC: 17 MG/DL (ref 8–23)
CALCIUM SERPL-MCNC: 8.3 MG/DL (ref 8.3–10.4)
CHLORIDE SERPL-SCNC: 106 MMOL/L (ref 98–107)
CO2 SERPL-SCNC: 30 MMOL/L (ref 21–32)
CREAT SERPL-MCNC: 0.96 MG/DL (ref 0.8–1.5)
DIFFERENTIAL METHOD BLD: ABNORMAL
EKG ATRIAL RATE: 59 BPM
EKG DIAGNOSIS: NORMAL
EKG P AXIS: 47 DEGREES
EKG P-R INTERVAL: 150 MS
EKG Q-T INTERVAL: 412 MS
EKG QRS DURATION: 90 MS
EKG QTC CALCULATION (BAZETT): 407 MS
EKG R AXIS: 46 DEGREES
EKG T AXIS: 172 DEGREES
EKG VENTRICULAR RATE: 59 BPM
EOSINOPHIL # BLD: 0.2 K/UL (ref 0–0.8)
EOSINOPHIL NFR BLD: 5 % (ref 0.5–7.8)
ERYTHROCYTE [DISTWIDTH] IN BLOOD BY AUTOMATED COUNT: 12.9 % (ref 11.9–14.6)
EST. AVERAGE GLUCOSE BLD GHB EST-MCNC: 114 MG/DL
GLUCOSE SERPL-MCNC: 102 MG/DL (ref 65–100)
HBA1C MFR BLD: 5.6 % (ref 4.8–5.6)
HCT VFR BLD AUTO: 40.1 % (ref 41.1–50.3)
HGB BLD-MCNC: 13 G/DL (ref 13.6–17.2)
IMM GRANULOCYTES # BLD AUTO: 0 K/UL (ref 0–0.5)
IMM GRANULOCYTES NFR BLD AUTO: 1 % (ref 0–5)
INR PPP: 1
LYMPHOCYTES # BLD: 0.9 K/UL (ref 0.5–4.6)
LYMPHOCYTES NFR BLD: 21 % (ref 13–44)
MCH RBC QN AUTO: 31.5 PG (ref 26.1–32.9)
MCHC RBC AUTO-ENTMCNC: 32.4 G/DL (ref 31.4–35)
MCV RBC AUTO: 97.1 FL (ref 79.6–97.8)
MONOCYTES # BLD: 0.4 K/UL (ref 0.1–1.3)
MONOCYTES NFR BLD: 10 % (ref 4–12)
NEUTS SEG # BLD: 2.6 K/UL (ref 1.7–8.2)
NEUTS SEG NFR BLD: 63 % (ref 43–78)
NRBC # BLD: 0 K/UL (ref 0–0.2)
PLATELET # BLD AUTO: 242 K/UL (ref 150–450)
PMV BLD AUTO: 9.3 FL (ref 9.4–12.3)
POTASSIUM SERPL-SCNC: 4.1 MMOL/L (ref 3.5–5.1)
PROTHROMBIN TIME: 13.9 SEC (ref 12.6–14.5)
RBC # BLD AUTO: 4.13 M/UL (ref 4.23–5.6)
SERVICE CMNT-IMP: NORMAL
SODIUM SERPL-SCNC: 141 MMOL/L (ref 136–145)
WBC # BLD AUTO: 4.2 K/UL (ref 4.3–11.1)

## 2022-07-26 PROCEDURE — 85610 PROTHROMBIN TIME: CPT

## 2022-07-26 PROCEDURE — 85730 THROMBOPLASTIN TIME PARTIAL: CPT

## 2022-07-26 PROCEDURE — 97161 PT EVAL LOW COMPLEX 20 MIN: CPT

## 2022-07-26 PROCEDURE — 80048 BASIC METABOLIC PNL TOTAL CA: CPT

## 2022-07-26 PROCEDURE — 85025 COMPLETE CBC W/AUTO DIFF WBC: CPT

## 2022-07-26 PROCEDURE — 87641 MR-STAPH DNA AMP PROBE: CPT

## 2022-07-26 PROCEDURE — 83036 HEMOGLOBIN GLYCOSYLATED A1C: CPT

## 2022-07-26 PROCEDURE — 94760 N-INVAS EAR/PLS OXIMETRY 1: CPT

## 2022-07-26 RX ORDER — IBUPROFEN 200 MG
200 TABLET ORAL EVERY 6 HOURS PRN
Status: ON HOLD | COMMUNITY
End: 2022-09-14 | Stop reason: HOSPADM

## 2022-07-26 RX ORDER — MELOXICAM 15 MG/1
15 TABLET ORAL AS NEEDED
COMMUNITY

## 2022-07-26 RX ORDER — CHOLECALCIFEROL (VITAMIN D3) 25 MCG
1 CAPSULE ORAL EVERY MORNING
COMMUNITY

## 2022-07-26 RX ORDER — CHLORAL HYDRATE 500 MG
1 CAPSULE ORAL DAILY
Status: ON HOLD | COMMUNITY
End: 2022-09-14 | Stop reason: HOSPADM

## 2022-07-26 ASSESSMENT — KOOS JR
RISING FROM SITTING: 2
STRAIGHTENING KNEE FULLY: 1
STANDING UPRIGHT: 1
HOW SEVERE IS YOUR KNEE STIFFNESS AFTER FIRST WAKING IN MORNING: 2
TWISING OR PIVOTING ON KNEE: 2
GOING UP OR DOWN STAIRS: 2
KOOS JR TOTAL INTERVAL SCORE: 57.14
BENDING TO THE FLOOR TO PICK UP OBJECT: 2

## 2022-07-26 ASSESSMENT — PAIN DESCRIPTION - ORIENTATION: ORIENTATION: LEFT;ANTERIOR;INNER

## 2022-07-26 ASSESSMENT — PAIN SCALES - GENERAL: PAINLEVEL_OUTOF10: 6

## 2022-07-26 ASSESSMENT — PAIN DESCRIPTION - DESCRIPTORS: DESCRIPTORS: SHARP

## 2022-07-26 ASSESSMENT — PAIN DESCRIPTION - LOCATION: LOCATION: KNEE

## 2022-07-26 NOTE — TELEPHONE ENCOUNTER
Kehinde Ahmadi w/ pre adm sf [phone] surg scheduled for 8/8 but at jt camp apt today said he tested positive for covid on 7/16 w/ a mild cough and anesth.  Protocol can't have  surg til at least 8/29/22

## 2022-07-26 NOTE — PROGRESS NOTES
07/26/22 0800   Treatment   Treatment Type Bedside spirometry   Oxygen Therapy/Pulse Ox   O2 Therapy Room air   Heart Rate 64   SpO2 98 %   Pulse Oximeter Device Mode Intermittent   $Pulse Oximeter $Spot check (single)   Bedside Spirometry   FEV-1/Actual (Liters)   (PT HAD COVID WITHIN LAST 2 WEEKS)   Initial respiratory Assessment completed with pt. Pt was interviewed and evaluated in Joint camp prior to surgery. Patient ID:  Kameron Kumar  [id number]  78 y.o.  1945  Surgeon: Dr. Rex Hernandez  Date of Surgery: [email or web address]  Procedure: Total Right Knee Arthroplasty  Primary Care Physician: Marzena Guevara, [address] [place] [address] [phone]  Specialists:     BS:      Pt taught proper COUGH technique  IS REVIEWED WITH PT AS WELL AS BENEFITS OF USING IS IN SEDENTARY PTS.   DIAPHRAGMATIC BREATHING EXERCISE INSTRUCTIONS GIVEN    History of smoking:   DENIES                 Quit date:         Secondhand smoke:PARENTS    Past procedures with Oxygen desaturation or delayed awakening:DENIES    Past Medical History:   Diagnosis Date    Arthritis     OA    COVID-19 07/16/2022    mild cough    Erectile dysfunction     GERD (gastroesophageal reflux disease)     seldom, TUMS prn- well controlled    Inguinal hernia     right    Mixed hyperlipidemia     Palpitations     Personal history of malignant neoplasm of prostate     Prostate cancer ([facility] [address] 03/2008    radiation 2007, prostatectomy 2008    Skin cancer, basal cell     skin basal cell, shoulder and back      POSITIVE FOR COVID 7/1/2022 - HEAD COLD RUNNY NOSE  Respiratory history:DENIES SOB                                 HX OF RIB FRACTURE January 2022                                  Respiratory meds:  DENIES    FAMILY PRESENT:             NO     PAST SLEEP STUDY:                     DENIES  HX OF JYOTSNA:                                       DENIES  JYOTSNA assessment:     DANGERS OF UNTREATED JYOTSNA EXPLAINED TO PT.                                          SLEEPS ON SIDE       &      BACK

## 2022-07-26 NOTE — PROGRESS NOTES
Pt's symptoms include:    Snoring  Tiredness- excessive daytime sleepiness  HTN  GERD  Neck size        42      cm  Modified Harmon stage 4  SACS Score  23  STOP BANG 7  Height     5 '  9  \"   Weight    190 lbs  BMI 28.12    Sleepiness Scale:     Sitting and reading 2    Watching TV 2    Sitting inactive in a public place 0    As a passenger in a car for an hour without a break 2    Lying down to rest in the afternoon when circumstances Permits 2    Sitting and talking to someone 0    Sitting quietly after lunch without alcohol 2    In a car, while stopped for a few minutes 0    Total :   10    Refer patient for sleep study based on above assessment. [facility]  Phone number:  [phone]        07/26/22 0800   STOP-Bang Questionnaire   Are you a loud and/or regular snorer? 1   Do you often feel tired or groggy upon awakening or do you awaken with a headache? 1   Have you been observed to gasp or stop breathing during sleep? 1   Are you often tired or fatigued during wake time hours? 1   Do you fall asleep sitting, reading, watching TV or driving? 1   Do you often have problems with memory or concentration? 0   Do you have or are you being treated for high blood pressure? 1   Recent BMI (Calculated) 28.2   Is BMI greater than 35 kg/m2? 0=No   Age older than 48years old? 1=Yes   Is your neck circumference greater than 17 inches (Male) or 16 inches (Female)?  0   Gender - Male 1=Yes   STOP-Bang Total Score 36.2

## 2022-07-26 NOTE — PROGRESS NOTES
Taye Sexton  : 1945  Primary: Medicare Part A And B  Secondary: [address] [facility] [place] [address].  [phone]        Physical Therapy Prehab Evaluation Summary:2022   Time In/Out   PT Charge Capture  Episode     MEDICAL/REFERRING DIAGNOSIS: Unilateral primary osteoarthritis, right knee [M17.11]  REFERRING PHYSICIAN: Mariela Luis., *    ICD-10: Treatment Diagnosis:   Pain in Right Knee (M25.561)  Stiffness of Right Knee, Not elsewhere classified (M25.661)    DATE OF SURGERY: 22  Assessment:   COMMENTS:  He is here alone and is having a R TKA. He will go home at [place] and have the support of his spouse. He has a straight cane, BSC and shower chair. He will need a RW prior to DC. He would like to go home on day of surgery if he is prepared. Dr. Karol Herrera did not really discuss this with him. PROBLEM LIST:   (Impacting functional limitations):  Mr. Sivakumar Gonzalez presents with the following lower extremity(s) problems:  Strength  Range of Motion  Pain INTERVENTIONS PLANNED:   (Benefits and precautions of physical therapy have been discussed with the patient.)  Home Exercise Program  Educational Discussion       GOALS: (Goals have been discussed and agreed upon with patient.)  Discharge Goals: Time Frame: 1 Day  Patient will demonstrate independence with a home exercise program designed to increase strength, range of motion, and pain control to minimize functional deficits and optimize patient for total joint replacement. Subjective:   Past Medical History/Comorbidities:   Mr. Sivakumar Gonzalez  has a past medical history of Arthritis, GERD (gastroesophageal reflux disease), Inguinal hernia, Mixed hyperlipidemia, Personal history of malignant neoplasm of prostate, Prostate cancer ([facility] [address] and Skin cancer, basal cell.   Mr. Sivakumar Gonzalez  has a past surgical history that includes Prostatectomy (); hernia repair (2013); and heent (01/2022). Allergies:  Patient has no allergy information on record.     Current Medications:  Refer to pre-assessment nursing note    Home Set-Up/Prior Level of Function:  Lives With: Spouse  Type of Home: House  Home Layout: Two level, Able to Live on Main level with bedroom/bathroom (16 steps to second level with 1 rail)  Home Access: Stairs to enter without rails  Entrance Stairs - Number of Steps: 2  Bathroom Shower/Tub: Walk-in shower  Bathroom Toilet: Standard  Bathroom Equipment: Shower chair, 3-in-1 commode  Home Equipment: Cane  ADL Assistance: Independent  Homemaking Assistance: Independent  Ambulation Assistance: Independent  Transfer Assistance: Independent  Mode of Transportation: Car  Occupation: Retired    Dominant Side:  No data recorded    Current Functional Status:   Ambulation:  [x] Independent  [] [address] [facility] [address] Only  [x] [address] [facility]  [] Use Assistive Device  [] Use Wheelchair Only Dressing:  [x] [address] from Someone for:  [] Sock/Shoes  [] Pants  [] Everything   Bathing/Showering:   [x] Independent  [] [address] from Someone  [] [address]:  [x] Routine house and yard work  [] Light Housework Only  [] None     Objective:   PAIN:   Pre-Treatment Pain  Pain Assessment: 0-10  Pain Level: 6 (at its worst)  Pain Location: Knee  Pain Orientation: Left, Anterior, Inner  Pain Descriptors: Sharp    Post Treatment: 2    Outcome Measure:   HOOS-JR:       KOOS-JR:  KOOS, Jr. Knee Survey Score: 12    LOWER EXTREMITY GROSS EVALUATION:  RIGHT LE   Within Functional Limits   Abnormal   Comments   Strength [] [] Strength RLE  Strength RLE: Exception  R Hip Flexion: 5/5  R Knee Flexion: 4/5  R Knee Extension: 5/5   Range of Motion [] [] AROM RLE (degrees)  RLE AROM: Exceptions  R Knee Flexion 0-145: 102  R Knee Extension 0: 7      LEFT LE Within Functional Limits   Abnormal   Comments   Strength [x] []  5/5   Range of Motion [x] []       UPPER EXTREMITY GROSS EVALUATION:     Within Functional Limits   Abnormal   Comments   Strength [x] []    Range of Motion [x] []      SENSATION  Sensation [x]       COGNITION/  PERCEPTION: Intact Impaired (Comments):   Orientation [x]     Vision [x]     Hearing [x]     Cognition  [x]       TRANSFERS: I Mod I S SBA CGA Min Mod Max Total  NT x2 Comments:   Sit to Stand [x] [] [] [] [] [] [] [] [] [] []    Stand to Sit [x] [] [] [] [] [] [] [] [] [] []    Stand pivot [x] [] [] [] [] [] [] [] [] [] []     [] [] [] [] [] [] [] [] [] [] []    I=Independent, Mod I=Modified Independent, S=Supervision, SBA=Standby Assistance, CGA=Contact Guard Assistance,   Min=Minimal Assistance, Mod=Moderate Assistance, Max=Maximal Assistance, Total=Total Assistance, NT=Not Tested    BALANCE: Good Fair+ Fair Fair- Poor NT Comments   Sitting Static [x] [] [] [] [] []    Sitting Dynamic [x] [] [] [] [] []              Standing Static [x] [] [] [] [] []    Standing Dynamic [] [x] [] [] [] []      Postural Assessment:   Genu Varus Right  Genu Varus Left  Leg length discrepancy   R LE /8\" shorter than L    GAIT: I Mod I S SBA CGA Min Mod Max Total  NT x2 Comments:   Level of Assistance [x] [] [] [] [] [] [] [] [] [] []    Weightbearing Status       [id number]  300 feet    Gait Quality Antalgic    DME None     Stairs      Ramp     I=Independent, Mod I=Modified Independent, S=Supervision, SBA=Standby Assistance, CGA=Contact Guard Assistance,   Min=Minimal Assistance, Mod=Moderate Assistance, Max=Maximal Assistance, Total=Total Assistance, NT=Not Tested    TREATMENT:   EVALUATION: LOW COMPLEXITY: (Untimed Charge)    TREATMENT PLAN:   Effective Dates: 7/26/2022 TO 7/26/2022. Treatment/Session Assessment:  Patient was instructed in PT- HEP to increase strength and ROM in LEs. Answered all questions. Frequency/Duration: Patient to continue to perform home exercise program at least twice per day up until his surgery.     EDUCATION: Education Given To: Patient  Education Provided: Role of Therapy, Plan of Care, Home Exercise Program, Precautions  Education Method: Demonstration, Verbal, Teach Back, Printed Information/Hand-outs, Video  Education Outcome: Verbalized understanding, Demonstrated understanding    MEDICAL NECESSITY: Mr. Nohelia Kapoor is expected to optimize hislower extremity strength and ROM in preparation for joint replacement surgery. REASON FOR CONTINUED SERVICES: Achieve baseline assesment of musculoskeletal system, functional mobility and home environment. , educate in PT HEP in preparation for surgery, educate in hospital plan of care. COMPLIANCE WITH PROGRAM/EXERCISE: compliant most of the time. TOTAL TREATMENT DURATION:  Time In: 0715  Time Out: 0730  Minutes: 13    Regarding Geoffrey Adams's therapy, I certify that the treatment plan above will be carried out by a therapist or under their direction.   Thank you for this referral,  Emily Mott, PT

## 2022-07-26 NOTE — PROGRESS NOTES
Pt states that he tested positive for COVID 19 on 7/16 22~symptoms included mild cough. According to anesthesia guidelines surgery must be pushed back 6 weeks from test date. Message left for Dr. Ivelisse Potts  at [phone].

## 2022-07-26 NOTE — PROGRESS NOTES
Patient verified name and . Order for consent  found in EHR and matches case posting; patient verified. Type 3 surgery, joint camp assessment complete. Labs per surgeon: CBC,BMP, PT/PTT, Hgb A1c ; results within anesthesia guidelines; routed via fax to surgeon, Dr Shantell Lizarraga for review  Labs per anesthesia protocol: no additional  EKG:completed today per protocol, abnormal; will have anesthesia review along with EKG from 19~call placed to pcp office [facility]~[phone] to request EKG. MRSA/MSSA swab collected; pharmacy to review and dose antibiotic as appropriate. Hospital approved surgical skin cleanser and instructions to return bottle on DOS given per hospital policy. Patient provided with handouts including Guide to Surgery, Pain Management, Hand Hygiene, Blood Transfusion Education, and [facility] Brochure. Patient answered medical/surgical history questions at their best of ability. All prior to admission medications documented in [facility]. Original medication prescription bottle not visualized during patient appointment. Patient instructed to hold all vitamins 3 weeks prior to surgery and NSAIDS 5 days prior to surgery. Patient teach back successful and patient demonstrates knowledge of instruction.

## 2022-07-26 NOTE — PROGRESS NOTES
Total Joint Surgery Preoperative Chart Review      Patient ID:  Alf Ramirez  [id number]  4 y.o.  1945  Surgeon: Dr. Debbi Lawrence  Date of Surgery: 8/8/2022  Procedure: Total Right Knee Arthroplasty  Primary Care Physician: Vaughn Hall [place] [phone]  Specialty Physician(s):      Subjective:   Alf Ramirez is a 78 y.o. White (non-) male who presents for preoperative evaluation for Total Right Knee arthroplasty. This is a preoperative chart review note based on data collected by the nurse at the surgical Pre-Assessment visit. Past Medical History:   Diagnosis Date    Arthritis     OA    COVID-19 07/16/2022    mild cough    Erectile dysfunction     GERD (gastroesophageal reflux disease)     seldom, TUMS prn- well controlled    Inguinal hernia     right    Mixed hyperlipidemia     Palpitations     Personal history of malignant neoplasm of prostate     Prostate cancer ([facility] [address] 03/2008    radiation 2007, prostatectomy 2008    Skin cancer, basal cell     skin basal cell, shoulder and back      Past Surgical History:   Procedure Laterality Date    COLONOSCOPY      HEENT  01/2022    implant/dental    HERNIA REPAIR  6/4/2013    ORTHOPEDIC SURGERY Right     cheilectomy    PROSTATECTOMY  2008    followed by radiation in 2012     Family History   Problem Relation Age of Onset    Stroke Mother     Heart Disease Father       Social History     Tobacco Use    Smoking status: Never     Passive exposure: Past (parents and work environment)    Smokeless tobacco: Never   Substance Use Topics    Alcohol use: Yes     Comment: occasionally       Prior to Admission medications    Medication Sig Start Date End Date Taking?  Authorizing Provider   Coenzyme Q10 (CO Q 10 PO) Take by mouth daily   Yes Historical Provider, MD   meloxicam (MOBIC) 15 MG tablet Take 15 mg by mouth as needed for Pain   Yes Historical Provider, MD   ibuprofen (ADVIL;MOTRIN) 200 MG tablet Take 200 mg by mouth every 6 hours as needed for Pain Yes Historical Provider, MD   Cholecalciferol (VITAMIN D-3) 25 MCG (1000 UT) CAPS Take 1 tablet by mouth every morning    Historical Provider, MD   Omega-3 Fatty Acids (FISH OIL) 1000 MG CAPS Take 1 tablet by mouth daily    Historical Provider, MD   Menaquinone-7 (VITAMIN K2 PO) Take 1 tablet by mouth every morning    Historical Provider, MD   vitamin A 3 MG (49784 UT) capsule Take 1 tablet by mouth every morning    Historical Provider, MD   acetaminophen (TYLENOL) 500 MG tablet Take 1,000 mg by mouth every 6 hours as needed    Ar Automatic Reconciliation   calcium carbonate (OS-COCO) 1250 (500 Ca) MG chewable tablet Take 2 tablets by mouth as needed    Ar Automatic Reconciliation   cephALEXin (KEFLEX) 500 MG capsule Take 500 mg by mouth 4 times daily 2/10/22   Ar Automatic Reconciliation   glucosamine-chondroitin 750-600 MG TABS tablet Take by mouth    Ar Automatic Reconciliation   simvastatin (ZOCOR) 20 MG tablet Take 20 mg by mouth nightly 12/21/15   Ar Automatic Reconciliation   tadalafil (CIALIS) 5 MG tablet Take 5 mg by mouth as needed 12/21/15   Ar Automatic Reconciliation     No Known Allergies       Objective:     Physical Exam:   Patient Vitals for the past 24 hrs:   Temp Pulse Resp BP   07/26/22 0754 98.1 °F (36.7 °C) 62 16 135/72       ECG:    No results found for this or any previous visit (from the past 4464 hour(s)).     Data Review:   Labs:   Recent Results (from the past 24 hour(s))   EKG 12 lead    Collection Time: 07/26/22  8:00 AM   Result Value Ref Range    Ventricular Rate 59 BPM    Atrial Rate 59 BPM    P-R Interval 150 ms    QRS Duration 90 ms    Q-T Interval 412 ms    QTc Calculation (Bazett) 407 ms    P Axis 47 degrees    R Axis 46 degrees    T Axis 172 degrees    Diagnosis Sinus bradycardia    Protime-INR    Collection Time: 07/26/22  8:03 AM   Result Value Ref Range    Protime 13.9 12.6 - 14.5 sec    INR 1.0     Hemoglobin A1c    Collection Time: 07/26/22  8:03 AM   Result Value Ref Range Hemoglobin A1C 5.6 4.8 - 5.6 %    eAG 114 mg/dL   CBC with Auto Differential    Collection Time: 07/26/22  8:03 AM   Result Value Ref Range    WBC 4.2 (L) 4.3 - 11.1 K/uL    RBC 4.13 (L) 4.23 - 5.6 M/uL    Hemoglobin 13.0 (L) 13.6 - 17.2 g/dL    Hematocrit 40.1 (L) 41.1 - 50.3 %    MCV 97.1 79.6 - 97.8 FL    MCH 31.5 26.1 - 32.9 PG    MCHC 32.4 31.4 - 35.0 g/dL    RDW 12.9 11.9 - 14.6 %    Platelets 874 491 - 222 K/uL    MPV 9.3 (L) 9.4 - 12.3 FL    nRBC 0.00 0.0 - 0.2 K/uL    Differential Type AUTOMATED      Seg Neutrophils 63 43 - 78 %    Lymphocytes 21 13 - 44 %    Monocytes 10 4.0 - 12.0 %    Eosinophils % 5 0.5 - 7.8 %    Basophils 1 0.0 - 2.0 %    Immature Granulocytes 1 0.0 - 5.0 %    Segs Absolute 2.6 1.7 - 8.2 K/UL    Absolute Lymph # 0.9 0.5 - 4.6 K/UL    Absolute Mono # 0.4 0.1 - 1.3 K/UL    Absolute Eos # 0.2 0.0 - 0.8 K/UL    Basophils Absolute 0.0 0.0 - 0.2 K/UL    Absolute Immature Granulocyte 0.0 0.0 - 0.5 K/UL   APTT    Collection Time: 07/26/22  8:03 AM   Result Value Ref Range    PTT 28.6 24.1 - 35.1 SEC   Basic Metabolic Panel    Collection Time: 07/26/22  8:03 AM   Result Value Ref Range    Sodium 141 136 - 145 mmol/L    Potassium 4.1 3.5 - 5.1 mmol/L    Chloride 106 98 - 107 mmol/L    CO2 30 21 - 32 mmol/L    Anion Gap 5 (L) 7 - 16 mmol/L    Glucose 102 (H) 65 - 100 mg/dL    BUN 17 8 - 23 MG/DL    Creatinine 0.96 0.8 - 1.5 MG/DL    GFR African American >60 >60 ml/min/1.73m2    GFR Non- >60 >60 ml/min/1.73m2    Calcium 8.3 8.3 - 10.4 MG/DL   MSSA/MRSA Screen BY PCR    Collection Time: 07/26/22  8:03 AM    Specimen: Swab; Blood   Result Value Ref Range    Special Requests NO SPECIAL REQUESTS      Culture        SA target not detected. A MRSA NEGATIVE, SA NEGATIVE test result does not preclude MRSA or SA nasal colonization.          Problem List:  )  Patient Active Problem List   Diagnosis    ED (erectile dysfunction)    Skin cancer, basal cell Arthritis    Prediabetes    Onychomycosis    Blepharoptosis    Eyelid twitch    Neck pain    GERD (gastroesophageal reflux disease)    Prostate cancer (HCC)    Mixed hyperlipidemia    Arthritis of knee, right    Snoring       Total Joint Surgery Pre-Assessment Recommendations:           Patient reports the symptoms of snoring, fatigue, observed apnea and /or excessive daytime sleepiness. Will refer patient for HST based on above assessment. Recommend continuous saturation monitoring hours of sleep, during hospitalization.       Signed By: NYA Mi - CNP-C    July 26, 2022

## 2022-07-26 NOTE — PROGRESS NOTES
PLEASE CONTINUE TAKING ALL PRESCRIPTION MEDICATIONS UP TO THE DAY OF SURGERY UNLESS OTHERWISE DIRECTED BELOW. DISCONTINUE all vitamins and supplements 21 days prior to surgery. DISCONTINUE Non-Steriodal Anti-Inflammatory (NSAIDS) such as Advil and Aleve 5 days prior to surgery. Home Medications to take  the day of surgery               Home Medications   to Hold   HOLD Meloxicam and Ibuprofen for 5 days prior to surgery        Comments       On the day before surgery please take Acetaminophen 1000mg in the morning and then again before bed. You may substitute for Tylenol 650 mg. Please do not bring home medications with you on the day of surgery unless otherwise directed by your nurse. If you are instructed to bring home medications, please give them to your nurse as they will be administered by the nursing staff. If you have any questions, please call [facility] [phone] or [address] [place] [address] [phone]. A copy of this note was provided to the patient for reference.

## 2022-07-26 NOTE — PROGRESS NOTES
How to Use Your Incentive Spirometer       About Your Incentive Spirometer  An incentive spirometer is a device that will expand your lungs by helping you to breathe more deeply and fully. The parts of your incentive spirometer are labeled in Figure 1. Using your incentive spirometer  When you're using your incentive spirometer, make sure to breathe through your mouth. If you breathe through your nose, the incentive spirometer won't work properly. You can hold your nose if you have trouble. DO NOT BLOW INTO THE DEVICE. If you feel dizzy at any time, stop and rest. Try again at a later time. Sit upright in a chair or in bed. Hold the incentive spirometer at eye level. Put the mouthpiece in your mouth and close your lips tightly around it. Slowly breathe out (exhale) completely. Breathe in (inhale) slowly through your mouth as deeply as you can. As you take the breath, you will see the piston rise inside the large column. While the piston rises, the indicator on the right should move upwards. It should stay in between the 2 arrows (see Figure 1). Try to get the piston as high as you can, while keeping the indicator between the arrows. If the indicator doesn't stay between the arrows, you're breathing either too fast or too slow. When you get it as high as you can, hold your breath for 10 seconds, or as long as possible. While you're holding your breath, the piston will slowly fall to the base of the spirometer. Once the piston reaches the bottom of the spirometer, breathe out slowly through your mouth. Rest for a few seconds. Repeat 10 times. Try to get the piston to the same level with each breath. After each set of 10 breaths, try to cough as coughing will help loosen or clear any mucus in your lungs. Put the marker at the level the piston reached on your incentive spirometer. This will be your goal next time. Repeat these steps every hour that you're awake.   Cover the mouthpiece of the incentive spirometer when you aren't using it

## 2022-08-10 ENCOUNTER — OFFICE VISIT (OUTPATIENT)
Dept: ORTHOPEDIC SURGERY | Age: 77
End: 2022-08-10
Payer: MEDICARE

## 2022-08-10 DIAGNOSIS — M79.671 RIGHT FOOT PAIN: Primary | ICD-10-CM

## 2022-08-10 DIAGNOSIS — M20.21 HALLUX RIGIDUS OF RIGHT FOOT: ICD-10-CM

## 2022-08-10 PROCEDURE — 99203 OFFICE O/P NEW LOW 30 MIN: CPT | Performed by: ORTHOPAEDIC SURGERY

## 2022-08-10 PROCEDURE — G8427 DOCREV CUR MEDS BY ELIG CLIN: HCPCS | Performed by: ORTHOPAEDIC SURGERY

## 2022-08-10 PROCEDURE — 3017F COLORECTAL CA SCREEN DOC REV: CPT | Performed by: ORTHOPAEDIC SURGERY

## 2022-08-10 PROCEDURE — 1036F TOBACCO NON-USER: CPT | Performed by: ORTHOPAEDIC SURGERY

## 2022-08-10 PROCEDURE — 1123F ACP DISCUSS/DSCN MKR DOCD: CPT | Performed by: ORTHOPAEDIC SURGERY

## 2022-08-10 PROCEDURE — G8417 CALC BMI ABV UP PARAM F/U: HCPCS | Performed by: ORTHOPAEDIC SURGERY

## 2022-08-10 NOTE — PROGRESS NOTES
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 male who presents with Follow-up (Right foot x-rays taken in office today)  . Patient presents back to the office today about 6 months out from a right big toe cheilectomy. He has to use of anti-inflammatories on occasion. His big problem today is continued pain and stiffness in the joint. He has tried the home exercise program fairly diligently. History was obtained by Patient     ROS/Meds/PSH/PMH/FH/SH: I personally reviewed the patients standard intake form. Below are the pertinents    Tobacco:  reports that he has never smoked. He has been exposed to tobacco smoke. He has never used smokeless tobacco.  Diabetes: None      Physical Examination:      Exam of the right foot and ankle shows a well-healed surgical incision. There is no sign of infection noted. He has about 10 degrees of dorsiflexion 10 degrees of plantarflexion of the great toe. The sesamoids are nontender. He has palpable pulses and intact sensation. Imaging:   I independently interpreted XR taken today  Right foot XR: AP, Lateral, Oblique views     ICD-10-CM    1.  Right foot pain  M79.671 XR FOOT RIGHT (MIN 3 VIEWS)      2. Hallux rigidus of right foot  M20.21          Report: AP, lateral, oblique x-ray of the right foot demonstrates stable first MTP cheilectomy    Impression: Stable first MTP cheilectomy   Lars Rothman III, MD           Assessment:   Right first MTP cheilectomy with arthrofibrosis    Treatment Plan:   4 This is a chronic illness/condition with exacerbation and progression  Treatment at this time: Physical Therapy  Studies ordered: NO XR needed @ Next Visit    Weight-bearing status: WBAT        Return to work/work restrictions: none  none    He has meloxicam which she can continue to continue to use sparingly. I recommended supervised physical therapy to see if we can improve his range of motion of the great toe. He would like to try this. Return in 2 to 3 months.

## 2022-08-31 ENCOUNTER — OFFICE VISIT (OUTPATIENT)
Dept: ORTHOPEDIC SURGERY | Age: 77
End: 2022-08-31

## 2022-08-31 DIAGNOSIS — Z01.818 ENCOUNTER FOR PRE-OPERATIVE EXAMINATION: Primary | ICD-10-CM

## 2022-08-31 DIAGNOSIS — M17.11 PRIMARY OSTEOARTHRITIS OF RIGHT KNEE: ICD-10-CM

## 2022-08-31 PROCEDURE — PREOPEXAM PRE-OP EXAM: Performed by: PHYSICIAN ASSISTANT

## 2022-08-31 NOTE — PROGRESS NOTES
[id number] [facility]  Pre Operative History and Physical Exam    Patient ID:  Susan Nunes  [id number]  89 y.o.  1945    Today: August 31, 2022           CC: Right knee pain    HPI:   The patient has end stage arthritis of the right knee. The patient was evaluated and examined during a consultation prior to this office visit. There have been no changes to the patient's orthopedic condition since the initial consultation. The patient has failed previous conservative treatment for this condition including antiinflammatories , and lifestyle modifications. The necessity for joint replacement is present.  The patient will be admitted the day of surgery for right knee replacement    Past Medical/Surgical History:  Past Medical History:   Diagnosis Date    Arthritis     OA    COVID-19 07/16/2022    mild cough    Erectile dysfunction     GERD (gastroesophageal reflux disease)     seldom, TUMS prn- well controlled    Inguinal hernia     right    Mixed hyperlipidemia     Palpitations     Personal history of malignant neoplasm of prostate     Prostate cancer ([facility] [address] 03/2008    radiation 2007, prostatectomy 2008    Skin cancer, basal cell     skin basal cell, shoulder and back     Past Surgical History:   Procedure Laterality Date    COLONOSCOPY      HEENT  01/2022    implant/dental    HERNIA REPAIR  6/4/2013    ORTHOPEDIC SURGERY Right     cheilectomy    PROSTATECTOMY  2008    followed by radiation in 2012        Allergies: No Known Allergies     Physical Exam:   General: NAD, Alert, Oriented, Appears their stated age     [de-identified]: NC/AT    Skin: No rashes, lesions or wounds seen      Psych: normal affect      Heart: Regular Rate, Rhythm     Lungs: unlabored respirations, no wheezing    Abdomen: Soft and non-distended     Ortho: Pain with limited ROM of the right knee    Neuro: no focal defects, moving extremities equally    Lymph: no lymphadenopathy     Meds:   Current Outpatient Medications   Medication Sig Cholecalciferol (VITAMIN D-3) 25 MCG (1000 UT) CAPS Take 1 tablet by mouth every morning    Omega-3 Fatty Acids (FISH OIL) 1000 MG CAPS Take 1 tablet by mouth daily    Menaquinone-7 (VITAMIN K2 PO) Take 1 tablet by mouth every morning    vitamin A 3 MG (53441 UT) capsule Take 1 tablet by mouth every morning    Coenzyme Q10 (CO Q 10 PO) Take by mouth daily    meloxicam (MOBIC) 15 MG tablet Take 15 mg by mouth as needed for Pain    ibuprofen (ADVIL;MOTRIN) 200 MG tablet Take 200 mg by mouth every 6 hours as needed for Pain    acetaminophen (TYLENOL) 500 MG tablet Take 1,000 mg by mouth every 6 hours as needed    calcium carbonate (OS-COCO) 1250 (500 Ca) MG chewable tablet Take 2 tablets by mouth as needed    cephALEXin (KEFLEX) 500 MG capsule Take 500 mg by mouth 4 times daily    glucosamine-chondroitin 750-600 MG TABS tablet Take by mouth    simvastatin (ZOCOR) 20 MG tablet Take 20 mg by mouth nightly    tadalafil (CIALIS) 5 MG tablet Take 5 mg by mouth as needed     No current facility-administered medications for this visit. Labs:  Hospital Outpatient Visit on 07/26/2022   Component Date Value Ref Range Status    Protime 07/26/2022 13.9  12.6 - 14.5 sec Final    INR 07/26/2022 1.0    Final    Comment: Suggested therapeutic INR range:  Venous thrombosis and embolus  2.0-3.0  Prosthetic heart valve         2.5-3.5  ** Note new reference range and method **      Hemoglobin A1C 07/26/2022 5.6  4.8 - 5.6 % Final    eAG 07/26/2022 114  mg/dL Final    Comment: (NOTE)  The eAG should be interpreted with patient characteristics in mind   since ethnicity, interindividual differences, red cell lifespan,   variation in rates of glycation, etc. may affect the validity of the   calculation.       Ventricular Rate 07/26/2022 59  BPM Final    Atrial Rate 07/26/2022 59  BPM Final    P-R Interval 07/26/2022 150  ms Final    QRS Duration 07/26/2022 90  ms Final    Q-T Interval 07/26/2022 412  ms Final    QTc Calculation (Theresa) 07/26/2022 407  ms Final    P Axis 07/26/2022 47  degrees Final    R Axis 07/26/2022 46  degrees Final    T Axis 07/26/2022 172  degrees Final    Diagnosis 07/26/2022 Sinus bradycardia   Final    WBC 07/26/2022 4.2 (A) 4.3 - 11.1 K/uL Final    RBC 07/26/2022 4.13 (A) 4.23 - 5.6 M/uL Final    Hemoglobin 07/26/2022 13.0 (A) 13.6 - 17.2 g/dL Final    Hematocrit 07/26/2022 40.1 (A) 41.1 - 50.3 % Final    MCV 07/26/2022 97.1  79.6 - 97.8 FL Final    MCH 07/26/2022 31.5  26.1 - 32.9 PG Final    MCHC 07/26/2022 32.4  31.4 - 35.0 g/dL Final    RDW 07/26/2022 12.9  11.9 - 14.6 % Final    Platelets 30/65/5694 242  150 - 450 K/uL Final    MPV 07/26/2022 9.3 (A) 9.4 - 12.3 FL Final    nRBC 07/26/2022 0.00  0.0 - 0.2 K/uL Final    **Note: Absolute NRBC parameter is now reported with Hemogram**    Differential Type 07/26/2022 AUTOMATED    Final    Seg Neutrophils 07/26/2022 63  43 - 78 % Final    Lymphocytes 07/26/2022 21  13 - 44 % Final    Monocytes 07/26/2022 10  4.0 - 12.0 % Final    Eosinophils % 07/26/2022 5  0.5 - 7.8 % Final    Basophils 07/26/2022 1  0.0 - 2.0 % Final    Immature Granulocytes 07/26/2022 1  0.0 - 5.0 % Final    Segs Absolute 07/26/2022 2.6  1.7 - 8.2 K/UL Final    Absolute Lymph # 07/26/2022 0.9  0.5 - 4.6 K/UL Final    Absolute Mono # 07/26/2022 0.4  0.1 - 1.3 K/UL Final    Absolute Eos # 07/26/2022 0.2  0.0 - 0.8 K/UL Final    Basophils Absolute 07/26/2022 0.0  0.0 - 0.2 K/UL Final    Absolute Immature Granulocyte 07/26/2022 0.0  0.0 - 0.5 K/UL Final    PTT 07/26/2022 28.6  24.1 - 35.1 SEC Final    Comment: Heparin Therapeutic Range = 74 - 123 seconds  In addition to factor deficiency, monitoring heparin therapy, etc., evaluation of a prolonged aPTT result should include consideration of preanalytic variables such as heparin flush contamination, specimen integrity issues, etc.      Sodium 07/26/2022 141  136 - 145 mmol/L Final    Potassium 07/26/2022 4.1  3.5 - 5.1 mmol/L Final    Chloride 07/26/2022 106  98 - 107 mmol/L Final    CO2 07/26/2022 30  21 - 32 mmol/L Final    Anion Gap 07/26/2022 5 (A) 7 - 16 mmol/L Final    Glucose 07/26/2022 102 (A) 65 - 100 mg/dL Final    BUN 07/26/2022 17  8 - 23 MG/DL Final    Creatinine 07/26/2022 0.96  0.8 - 1.5 MG/DL Final    GFR  07/26/2022 >60  >60 ml/min/1.73m2 Final    GFR Non- 07/26/2022 >60  >60 ml/min/1.73m2 Final    Comment:      Estimated GFR is calculated using the Modification of Diet in Renal Disease (MDRD) Study equation, reported for both  Americans (GFRAA) and non- Americans (GFRNA), and normalized to 1.73m2 body surface area. The physician must decide which value applies to the patient. The MDRD study equation should only be used in individuals age 25 or older. It has not been validated for the following: pregnant women, patients with serious comorbid conditions,or on certain medications, or persons with extremes of body size, muscle mass, or nutritional status. Calcium 07/26/2022 8.3  8.3 - 10.4 MG/DL Final    Special Requests 07/26/2022 NO SPECIAL REQUESTS    Final    Culture 07/26/2022 SA target not detected. A MRSA NEGATIVE, SA NEGATIVE test result does not preclude MRSA or SA nasal colonization. Final                 Patient Active Problem List   Diagnosis    ED (erectile dysfunction)    Skin cancer, basal cell    Arthritis    Prediabetes    Onychomycosis    Blepharoptosis    Eyelid twitch    Neck pain    GERD (gastroesophageal reflux disease)    Prostate cancer (HCC)    Mixed hyperlipidemia    Arthritis of knee, right    Snoring         Assessment:   1. Arthritis of the right knee      Plan:    1. Proceed with scheduled right knee replacement      All material risks, benefits, and reasonable alternatives including postponing the procedure were discussed. The patient does wish to proceed with the procedure at this time.          Signed By: Dharmesh Whelan PA  August 31, 2022

## 2022-08-31 NOTE — H&P (VIEW-ONLY)
[id number] [facility]  Pre Operative History and Physical Exam    Patient ID:  Luis Estrella  [id number]  79 y.o.  1945    Today: August 31, 2022           CC: Right knee pain    HPI:   The patient has end stage arthritis of the right knee. The patient was evaluated and examined during a consultation prior to this office visit. There have been no changes to the patient's orthopedic condition since the initial consultation. The patient has failed previous conservative treatment for this condition including antiinflammatories , and lifestyle modifications. The necessity for joint replacement is present.  The patient will be admitted the day of surgery for right knee replacement    Past Medical/Surgical History:  Past Medical History:   Diagnosis Date    Arthritis     OA    COVID-19 07/16/2022    mild cough    Erectile dysfunction     GERD (gastroesophageal reflux disease)     seldom, TUMS prn- well controlled    Inguinal hernia     right    Mixed hyperlipidemia     Palpitations     Personal history of malignant neoplasm of prostate     Prostate cancer ([facility] [address] 03/2008    radiation 2007, prostatectomy 2008    Skin cancer, basal cell     skin basal cell, shoulder and back     Past Surgical History:   Procedure Laterality Date    COLONOSCOPY      HEENT  01/2022    implant/dental    HERNIA REPAIR  6/4/2013    ORTHOPEDIC SURGERY Right     cheilectomy    PROSTATECTOMY  2008    followed by radiation in 2012        Allergies: No Known Allergies     Physical Exam:   General: NAD, Alert, Oriented, Appears their stated age     [de-identified]: NC/AT    Skin: No rashes, lesions or wounds seen      Psych: normal affect      Heart: Regular Rate, Rhythm     Lungs: unlabored respirations, no wheezing    Abdomen: Soft and non-distended     Ortho: Pain with limited ROM of the right knee    Neuro: no focal defects, moving extremities equally    Lymph: no lymphadenopathy     Meds:   Current Outpatient Medications   Medication Sig Cholecalciferol (VITAMIN D-3) 25 MCG (1000 UT) CAPS Take 1 tablet by mouth every morning    Omega-3 Fatty Acids (FISH OIL) 1000 MG CAPS Take 1 tablet by mouth daily    Menaquinone-7 (VITAMIN K2 PO) Take 1 tablet by mouth every morning    vitamin A 3 MG (58161 UT) capsule Take 1 tablet by mouth every morning    Coenzyme Q10 (CO Q 10 PO) Take by mouth daily    meloxicam (MOBIC) 15 MG tablet Take 15 mg by mouth as needed for Pain    ibuprofen (ADVIL;MOTRIN) 200 MG tablet Take 200 mg by mouth every 6 hours as needed for Pain    acetaminophen (TYLENOL) 500 MG tablet Take 1,000 mg by mouth every 6 hours as needed    calcium carbonate (OS-COCO) 1250 (500 Ca) MG chewable tablet Take 2 tablets by mouth as needed    cephALEXin (KEFLEX) 500 MG capsule Take 500 mg by mouth 4 times daily    glucosamine-chondroitin 750-600 MG TABS tablet Take by mouth    simvastatin (ZOCOR) 20 MG tablet Take 20 mg by mouth nightly    tadalafil (CIALIS) 5 MG tablet Take 5 mg by mouth as needed     No current facility-administered medications for this visit. Labs:  Hospital Outpatient Visit on 07/26/2022   Component Date Value Ref Range Status    Protime 07/26/2022 13.9  12.6 - 14.5 sec Final    INR 07/26/2022 1.0    Final    Comment: Suggested therapeutic INR range:  Venous thrombosis and embolus  2.0-3.0  Prosthetic heart valve         2.5-3.5  ** Note new reference range and method **      Hemoglobin A1C 07/26/2022 5.6  4.8 - 5.6 % Final    eAG 07/26/2022 114  mg/dL Final    Comment: (NOTE)  The eAG should be interpreted with patient characteristics in mind   since ethnicity, interindividual differences, red cell lifespan,   variation in rates of glycation, etc. may affect the validity of the   calculation.       Ventricular Rate 07/26/2022 59  BPM Final    Atrial Rate 07/26/2022 59  BPM Final    P-R Interval 07/26/2022 150  ms Final    QRS Duration 07/26/2022 90  ms Final    Q-T Interval 07/26/2022 412  ms Final    QTc Calculation (Theresa) 07/26/2022 407  ms Final    P Axis 07/26/2022 47  degrees Final    R Axis 07/26/2022 46  degrees Final    T Axis 07/26/2022 172  degrees Final    Diagnosis 07/26/2022 Sinus bradycardia   Final    WBC 07/26/2022 4.2 (A) 4.3 - 11.1 K/uL Final    RBC 07/26/2022 4.13 (A) 4.23 - 5.6 M/uL Final    Hemoglobin 07/26/2022 13.0 (A) 13.6 - 17.2 g/dL Final    Hematocrit 07/26/2022 40.1 (A) 41.1 - 50.3 % Final    MCV 07/26/2022 97.1  79.6 - 97.8 FL Final    MCH 07/26/2022 31.5  26.1 - 32.9 PG Final    MCHC 07/26/2022 32.4  31.4 - 35.0 g/dL Final    RDW 07/26/2022 12.9  11.9 - 14.6 % Final    Platelets 05/03/9405 242  150 - 450 K/uL Final    MPV 07/26/2022 9.3 (A) 9.4 - 12.3 FL Final    nRBC 07/26/2022 0.00  0.0 - 0.2 K/uL Final    **Note: Absolute NRBC parameter is now reported with Hemogram**    Differential Type 07/26/2022 AUTOMATED    Final    Seg Neutrophils 07/26/2022 63  43 - 78 % Final    Lymphocytes 07/26/2022 21  13 - 44 % Final    Monocytes 07/26/2022 10  4.0 - 12.0 % Final    Eosinophils % 07/26/2022 5  0.5 - 7.8 % Final    Basophils 07/26/2022 1  0.0 - 2.0 % Final    Immature Granulocytes 07/26/2022 1  0.0 - 5.0 % Final    Segs Absolute 07/26/2022 2.6  1.7 - 8.2 K/UL Final    Absolute Lymph # 07/26/2022 0.9  0.5 - 4.6 K/UL Final    Absolute Mono # 07/26/2022 0.4  0.1 - 1.3 K/UL Final    Absolute Eos # 07/26/2022 0.2  0.0 - 0.8 K/UL Final    Basophils Absolute 07/26/2022 0.0  0.0 - 0.2 K/UL Final    Absolute Immature Granulocyte 07/26/2022 0.0  0.0 - 0.5 K/UL Final    PTT 07/26/2022 28.6  24.1 - 35.1 SEC Final    Comment: Heparin Therapeutic Range = 74 - 123 seconds  In addition to factor deficiency, monitoring heparin therapy, etc., evaluation of a prolonged aPTT result should include consideration of preanalytic variables such as heparin flush contamination, specimen integrity issues, etc.      Sodium 07/26/2022 141  136 - 145 mmol/L Final    Potassium 07/26/2022 4.1  3.5 - 5.1 mmol/L Final    Chloride 07/26/2022 106  98 - 107 mmol/L Final    CO2 07/26/2022 30  21 - 32 mmol/L Final    Anion Gap 07/26/2022 5 (A) 7 - 16 mmol/L Final    Glucose 07/26/2022 102 (A) 65 - 100 mg/dL Final    BUN 07/26/2022 17  8 - 23 MG/DL Final    Creatinine 07/26/2022 0.96  0.8 - 1.5 MG/DL Final    GFR  07/26/2022 >60  >60 ml/min/1.73m2 Final    GFR Non- 07/26/2022 >60  >60 ml/min/1.73m2 Final    Comment:      Estimated GFR is calculated using the Modification of Diet in Renal Disease (MDRD) Study equation, reported for both  Americans (GFRAA) and non- Americans (GFRNA), and normalized to 1.73m2 body surface area. The physician must decide which value applies to the patient. The MDRD study equation should only be used in individuals age 25 or older. It has not been validated for the following: pregnant women, patients with serious comorbid conditions,or on certain medications, or persons with extremes of body size, muscle mass, or nutritional status. Calcium 07/26/2022 8.3  8.3 - 10.4 MG/DL Final    Special Requests 07/26/2022 NO SPECIAL REQUESTS    Final    Culture 07/26/2022 SA target not detected. A MRSA NEGATIVE, SA NEGATIVE test result does not preclude MRSA or SA nasal colonization. Final                 Patient Active Problem List   Diagnosis    ED (erectile dysfunction)    Skin cancer, basal cell    Arthritis    Prediabetes    Onychomycosis    Blepharoptosis    Eyelid twitch    Neck pain    GERD (gastroesophageal reflux disease)    Prostate cancer (HCC)    Mixed hyperlipidemia    Arthritis of knee, right    Snoring         Assessment:   1. Arthritis of the right knee      Plan:    1. Proceed with scheduled right knee replacement      All material risks, benefits, and reasonable alternatives including postponing the procedure were discussed. The patient does wish to proceed with the procedure at this time.          Signed By: Toyin Franco PA  August 31, 2022

## 2022-08-31 NOTE — PATIENT INSTRUCTIONS
Patient Education        Total Knee Replacement: Before Your Surgery  What is a total knee replacement? A total knee replacement replaces the worn ends of the bones where they meet at the knee. Those bones are the thighbone (femur) and the lower leg bone (tibia). Your doctor will remove the damaged bone. Then your doctor will replace it with plastic and metal parts. These new parts may be attached to your bones withcement. Your doctor will make a cut down the center of your knee. This cut is called an incision. It will be several inches long. Sometimes the surgery can be done with a smaller incision. Both kinds of incisions leave scars that usually fadewith time. Your doctor will let you know if you will stay in the hospital or if you can go home the day of surgery. If you have both knees done at the same time, you mayneed to be in the hospital for a few days. Most people go back to normal activities or work in 4 to 16 weeks. This depends on your health. It also depends on how well your knee does in your rehabprogram. This may take longer if you have both knees done at the same time. How do you prepare for surgery? Surgery can be stressful. This information will help you understand what youcan expect. And it will help you safely prepare for surgery. Preparing for surgery    You may need to shower or bathe with a special soap the night before and the morning of your surgery. The soap contains chlorhexidine. It reduces the amount of bacteria on your skin that could cause an infection after surgery. Be sure you have someone to take you home. Anesthesia and pain medicine will make it unsafe for you to drive or get home on your own. Understand exactly what surgery is planned, along with the risks, benefits, and other options. If you take aspirin or some other blood thinner, ask your doctor if you should stop taking it before your surgery.  Make sure that you understand exactly what your doctor wants you to do. These medicines increase the risk of bleeding. Tell your doctor ALL the medicines, vitamins, supplements, and herbal remedies you take. Some may increase the risk of problems during your surgery. Your doctor will tell you if you should stop taking any of them before the surgery and how soon to do it. Make sure your doctor and the hospital have a copy of your advance directive. If you don't have one, you may want to prepare one. It lets others know your health care wishes. It's a good thing to have before any type of surgery or procedure. What happens on the day of surgery? Follow the instructions exactly about when to stop eating and drinking. If you don't, your surgery may be canceled. If your doctor told you to take your medicines on the day of surgery, take them with only a sip of water. Take a bath or shower before you come in for your surgery. Do not apply lotions, perfumes, deodorants, or nail polish. Do not shave the surgical site yourself. Take off all jewelry and piercings. And take out contact lenses, if you wear them. At the hospital or surgery center   Bring a picture ID. The area for surgery is often marked to make sure there are no errors. You will be kept comfortable and safe by your anesthesia provider. The anesthesia may make you sleep. Or it may just numb the area being worked on. You also will get antibiotics through the IV tube before surgery. This lowers the risk of an infection of the incision. The surgery will take about 2 to 3 hours. When should you call your doctor? You have questions or concerns. You don't understand how to prepare for your surgery. You become ill before the surgery (such as fever, flu, or a cold). You need to reschedule or have changed your mind about having the surgery. Where can you learn more? Go to [email or web address].[email or web address] org and sign in to your [employer] account.  Enter O806 in the Search Health Information box to learn more about \"Total Knee Replacement: Before Your Surgery. \"     If you do not have an account, please click on the \"Sign Up Now\" link. Current as of: March 9, 2022               Content Version: 13.3  © 2006-2022 [employer]. Care instructions adapted under license by [facility] [facility] ([facility]). If you have questions about a medical condition or this instruction, always ask your healthcare professional. [address] any warranty or liability for your use of this information. Patient Education        Total Knee Replacement: What to Expect at the Hospital  What care can you expect in the hospital?     After knee replacement surgery, you will be taken to the recovery room. In a few hours, you may go to a hospital room. You may see a metal triangle called a trapeze over your bed. You can use this to help move yourself around in bed. You will be very tired and will want to rest. Your nurse may also help turn youas you rest.  You will be getting fluids into your vein through an IV tube. You may also havea tube called a drain near the cut (incision) in your knee. You may not feel hungry. You may feel sick to your stomach or constipated for a couple of days. This is common. Your nurse may give you stool softeners orlaxatives to help with constipation. You may have stockings that put pressure on your legs to prevent blood clots. Your nurse will also give you medicine and exercise instructions to helpprevent clots. Most people get out of bed with help on the day of surgery. Your doctor will let you know if you will stay in the hospital or if you can go home the day ofsurgery. This care sheet gives you a general idea about how your recovery will begin in the hospital. Each person has a different experience and recovers at adifferent pace. What will happen in the hospital?  Pain and pain medicine    You will receive medicine to help control pain.  Some pain medicines are given through an IV. Others are taken by mouth. Take it as needed, and remember that it is easier to prevent pain before it starts than to stop it after it has started. If you are still in pain after you take your medicine, tell your nurse. You may need new medicine or to get the medicine in a different form. Other medicines    Your doctor will tell you if and when you can restart your medicines. You will also get instructions about taking any new medicines. If you take aspirin or some other blood thinner, ask your doctor if and when to start taking it again. Make sure that you understand exactly what your doctor wants you to do. Your doctor will probably give you blood thinners to prevent blood clots in your leg. You take this medicine during your hospital stay and when you go home. Rehabilitation    Your rehabilitation (rehab) will probably begin the day of your surgery. Your physical therapist will get you started. It may be painful to exercise at first, but your nurse will give you pain medicine if you need it. Your physical therapist will help you walk, go up and down stairs, and get in and out of bed and chairs. The therapist will help improve the movement (range of motion) and strength in your knee. You will learn positions and motions that will help prevent your knee from popping out (dislocating). This is a very important part of your therapy. How quickly you regain strength and motion and do things on your own depends on how well you follow your physical therapy. Your physical therapist will teach you the exercises, but you must do them yourself. An occupational therapist will work with you. You will learn how to bathe, dress, and do daily activities. You may need tools to help with everyday activities. Tools include shower stools, shoehorns, and long-handled sponges.    Diet    You will probably get liquids at first, but you can start to eat your normal diet when you feel like it. If your stomach is upset, your nurse will probably bring you bland, low-fat foods like plain rice, broiled chicken, toast, and yogurt. Drinking enough fluids, taking a stool softener, and eating foods that are good sources of fiber can help you avoid constipation after surgery. Incision care    You will have a bandage over your incision. Your nurse will care for this. Other instructions    Your nurse or respiratory therapist will have you do breathing and coughing exercises to prevent problems such as pneumonia. Breathe in deeply through your nose, and slowly breathe out through your mouth. Do this 3 times, and then cough 2 times. You may have a device (incentive spirometer) that you suck air through to help keep your lungs healthy. Use this as your nurse or therapist tells you to. Follow-up care is a key part of your treatment and safety. Be sure to make and go to all appointments, and call your doctor if you are having problems. It's also a good idea to know your test results and keep alist of the medicines you take. When should you call for help? You have severe trouble breathing. You have a cough, shortness of breath, or chest pain. You are sick to your stomach or cannot keep fluids down. You have signs of a blood clot, such as:  Pain in your calf, back of the knee, thigh, or groin. Redness and swelling in your leg or groin. You are in pain or your pain does not get better after you take pain medicine. Bright red blood has soaked through the bandage over your incision. You have signs of infection, such as: Increased pain, swelling, warmth, or redness. Red streaks leading from the incision. Pus draining from the incision. A fever. Where can you learn more? Go to https://[email or web address].[email or web address] [email or web address] org and sign in to your [employer] account.  Enter S379 in the MyDream Interactive box to learn more about \"Total Knee Replacement: What to Expect at the Hospital.\"     If you do not have an account, please click on the \"Sign Up Now\" link. Current as of: March 9, 2022               Content Version: 13.3  © 2006-2022 Healthwise, Incorporated. Care instructions adapted under license by [facility] ([facility]). If you have questions about a medical condition or this instruction, always ask your healthcare professional. [address] any warranty or liability for your use of this information.

## 2022-09-09 NOTE — PERIOP NOTE
Call to pt for updated phone assessment. Pt completed [address] 7/26/22 but surgery postponed due to Covid. Pt denies any change in history. Has all teaching materials from [address] visit and has no questions.

## 2022-09-13 ENCOUNTER — ANESTHESIA EVENT (OUTPATIENT)
Dept: SURGERY | Age: 77
End: 2022-09-13
Payer: MEDICARE

## 2022-09-14 ENCOUNTER — ANESTHESIA (OUTPATIENT)
Dept: SURGERY | Age: 77
End: 2022-09-14
Payer: MEDICARE

## 2022-09-14 ENCOUNTER — HOSPITAL ENCOUNTER (OUTPATIENT)
Age: 77
Discharge: HOME HEALTH | End: 2022-09-14
Attending: ORTHOPAEDIC SURGERY | Admitting: ORTHOPAEDIC SURGERY
Payer: MEDICARE

## 2022-09-14 VITALS
HEART RATE: 64 BPM | HEIGHT: 69 IN | TEMPERATURE: 97.6 F | WEIGHT: 194 LBS | SYSTOLIC BLOOD PRESSURE: 135 MMHG | RESPIRATION RATE: 16 BRPM | BODY MASS INDEX: 28.73 KG/M2 | DIASTOLIC BLOOD PRESSURE: 75 MMHG | OXYGEN SATURATION: 99 %

## 2022-09-14 DIAGNOSIS — M17.11 ARTHRITIS OF KNEE, RIGHT: ICD-10-CM

## 2022-09-14 DIAGNOSIS — M17.11 UNILATERAL PRIMARY OSTEOARTHRITIS, RIGHT KNEE: Primary | ICD-10-CM

## 2022-09-14 PROCEDURE — 97161 PT EVAL LOW COMPLEX 20 MIN: CPT

## 2022-09-14 PROCEDURE — 3700000001 HC ADD 15 MINUTES (ANESTHESIA): Performed by: ORTHOPAEDIC SURGERY

## 2022-09-14 PROCEDURE — 6360000002 HC RX W HCPCS: Performed by: PHYSICIAN ASSISTANT

## 2022-09-14 PROCEDURE — 7100000001 HC PACU RECOVERY - ADDTL 15 MIN: Performed by: ORTHOPAEDIC SURGERY

## 2022-09-14 PROCEDURE — 97165 OT EVAL LOW COMPLEX 30 MIN: CPT

## 2022-09-14 PROCEDURE — 6370000000 HC RX 637 (ALT 250 FOR IP): Performed by: ANESTHESIOLOGY

## 2022-09-14 PROCEDURE — 20985 CPTR-ASST DIR MS PX: CPT | Performed by: ORTHOPAEDIC SURGERY

## 2022-09-14 PROCEDURE — 6360000002 HC RX W HCPCS: Performed by: NURSE ANESTHETIST, CERTIFIED REGISTERED

## 2022-09-14 PROCEDURE — 64447 NJX AA&/STRD FEMORAL NRV IMG: CPT | Performed by: ANESTHESIOLOGY

## 2022-09-14 PROCEDURE — 27447 TOTAL KNEE ARTHROPLASTY: CPT | Performed by: ORTHOPAEDIC SURGERY

## 2022-09-14 PROCEDURE — 6370000000 HC RX 637 (ALT 250 FOR IP): Performed by: PHYSICIAN ASSISTANT

## 2022-09-14 PROCEDURE — 3600000015 HC SURGERY LEVEL 5 ADDTL 15MIN: Performed by: ORTHOPAEDIC SURGERY

## 2022-09-14 PROCEDURE — 94760 N-INVAS EAR/PLS OXIMETRY 1: CPT

## 2022-09-14 PROCEDURE — 2709999900 HC NON-CHARGEABLE SUPPLY: Performed by: ORTHOPAEDIC SURGERY

## 2022-09-14 PROCEDURE — 3600000005 HC SURGERY LEVEL 5 BASE: Performed by: ORTHOPAEDIC SURGERY

## 2022-09-14 PROCEDURE — 6360000002 HC RX W HCPCS: Performed by: ANESTHESIOLOGY

## 2022-09-14 PROCEDURE — 97535 SELF CARE MNGMENT TRAINING: CPT

## 2022-09-14 PROCEDURE — 27447 TOTAL KNEE ARTHROPLASTY: CPT | Performed by: PHYSICIAN ASSISTANT

## 2022-09-14 PROCEDURE — 2500000003 HC RX 250 WO HCPCS: Performed by: NURSE ANESTHETIST, CERTIFIED REGISTERED

## 2022-09-14 PROCEDURE — 2580000003 HC RX 258: Performed by: ORTHOPAEDIC SURGERY

## 2022-09-14 PROCEDURE — 2580000003 HC RX 258: Performed by: ANESTHESIOLOGY

## 2022-09-14 PROCEDURE — C1776 JOINT DEVICE (IMPLANTABLE): HCPCS | Performed by: ORTHOPAEDIC SURGERY

## 2022-09-14 PROCEDURE — 97530 THERAPEUTIC ACTIVITIES: CPT

## 2022-09-14 PROCEDURE — 2720000010 HC SURG SUPPLY STERILE: Performed by: ORTHOPAEDIC SURGERY

## 2022-09-14 PROCEDURE — 3700000000 HC ANESTHESIA ATTENDED CARE: Performed by: ORTHOPAEDIC SURGERY

## 2022-09-14 PROCEDURE — 7100000000 HC PACU RECOVERY - FIRST 15 MIN: Performed by: ORTHOPAEDIC SURGERY

## 2022-09-14 PROCEDURE — C1713 ANCHOR/SCREW BN/BN,TIS/BN: HCPCS | Performed by: ORTHOPAEDIC SURGERY

## 2022-09-14 PROCEDURE — 97110 THERAPEUTIC EXERCISES: CPT

## 2022-09-14 PROCEDURE — 2500000003 HC RX 250 WO HCPCS: Performed by: ORTHOPAEDIC SURGERY

## 2022-09-14 PROCEDURE — 6360000002 HC RX W HCPCS: Performed by: ORTHOPAEDIC SURGERY

## 2022-09-14 DEVICE — TIBIAL BEARING INSERT
Type: IMPLANTABLE DEVICE | Site: KNEE | Status: FUNCTIONAL
Brand: TRIATHLON

## 2022-09-14 DEVICE — COMPONENT TOT KNEE CAPPED PRIMARY K2STRYKER] STRYKER CORP]: Type: IMPLANTABLE DEVICE | Status: FUNCTIONAL

## 2022-09-14 DEVICE — TIBIAL COMPONENT
Type: IMPLANTABLE DEVICE | Site: KNEE | Status: FUNCTIONAL
Brand: TRIATHLON

## 2022-09-14 DEVICE — PATELLA
Type: IMPLANTABLE DEVICE | Site: KNEE | Status: FUNCTIONAL
Brand: TRIATHLON

## 2022-09-14 DEVICE — CRUCIATE RETAINING FEMORAL
Type: IMPLANTABLE DEVICE | Site: KNEE | Status: FUNCTIONAL
Brand: TRIATHLON

## 2022-09-14 DEVICE — CEMENT BNE 20GM HALF DOSE PMMA VISC RADPQ FAST: Type: IMPLANTABLE DEVICE | Site: KNEE | Status: FUNCTIONAL

## 2022-09-14 RX ORDER — SODIUM CHLORIDE 0.9 % (FLUSH) 0.9 %
5-40 SYRINGE (ML) INJECTION EVERY 12 HOURS SCHEDULED
Status: DISCONTINUED | OUTPATIENT
Start: 2022-09-14 | End: 2022-09-14 | Stop reason: HOSPADM

## 2022-09-14 RX ORDER — UREA 10 %
2500 LOTION (ML) TOPICAL PRN
Status: DISCONTINUED | OUTPATIENT
Start: 2022-09-14 | End: 2022-09-14

## 2022-09-14 RX ORDER — SENNA AND DOCUSATE SODIUM 50; 8.6 MG/1; MG/1
1 TABLET, FILM COATED ORAL 2 TIMES DAILY
Status: DISCONTINUED | OUTPATIENT
Start: 2022-09-14 | End: 2022-09-14 | Stop reason: HOSPADM

## 2022-09-14 RX ORDER — SODIUM CHLORIDE 0.9 % (FLUSH) 0.9 %
5-40 SYRINGE (ML) INJECTION PRN
Status: DISCONTINUED | OUTPATIENT
Start: 2022-09-14 | End: 2022-09-14 | Stop reason: HOSPADM

## 2022-09-14 RX ORDER — LIDOCAINE HYDROCHLORIDE 20 MG/ML
INJECTION, SOLUTION EPIDURAL; INFILTRATION; INTRACAUDAL; PERINEURAL PRN
Status: DISCONTINUED | OUTPATIENT
Start: 2022-09-14 | End: 2022-09-14 | Stop reason: SDUPTHER

## 2022-09-14 RX ORDER — GLYCOPYRROLATE 0.2 MG/ML
INJECTION INTRAMUSCULAR; INTRAVENOUS PRN
Status: DISCONTINUED | OUTPATIENT
Start: 2022-09-14 | End: 2022-09-14 | Stop reason: SDUPTHER

## 2022-09-14 RX ORDER — OXYCODONE HYDROCHLORIDE 5 MG/1
5-10 TABLET ORAL EVERY 4 HOURS PRN
Qty: 60 TABLET | Refills: 0 | Status: SHIPPED | OUTPATIENT
Start: 2022-09-14 | End: 2022-09-19

## 2022-09-14 RX ORDER — ASPIRIN 81 MG/1
81 TABLET ORAL 2 TIMES DAILY
Status: DISCONTINUED | OUTPATIENT
Start: 2022-09-14 | End: 2022-09-14 | Stop reason: HOSPADM

## 2022-09-14 RX ORDER — EPHEDRINE SULFATE/0.9% NACL/PF 50 MG/5 ML
SYRINGE (ML) INTRAVENOUS PRN
Status: DISCONTINUED | OUTPATIENT
Start: 2022-09-14 | End: 2022-09-14 | Stop reason: SDUPTHER

## 2022-09-14 RX ORDER — OXYCODONE HYDROCHLORIDE 5 MG/1
10 TABLET ORAL EVERY 4 HOURS PRN
Status: DISCONTINUED | OUTPATIENT
Start: 2022-09-14 | End: 2022-09-14 | Stop reason: HOSPADM

## 2022-09-14 RX ORDER — ASPIRIN 81 MG/1
81 TABLET ORAL EVERY 12 HOURS
Qty: 70 TABLET | Refills: 0 | Status: SHIPPED | OUTPATIENT
Start: 2022-09-14 | End: 2022-10-19

## 2022-09-14 RX ORDER — ACETAMINOPHEN 500 MG
1000 TABLET ORAL ONCE
Status: COMPLETED | OUTPATIENT
Start: 2022-09-14 | End: 2022-09-14

## 2022-09-14 RX ORDER — HYDROMORPHONE HYDROCHLORIDE 1 MG/ML
0.25 INJECTION, SOLUTION INTRAMUSCULAR; INTRAVENOUS; SUBCUTANEOUS EVERY 5 MIN PRN
Status: DISCONTINUED | OUTPATIENT
Start: 2022-09-14 | End: 2022-09-14 | Stop reason: HOSPADM

## 2022-09-14 RX ORDER — DEXAMETHASONE SODIUM PHOSPHATE 10 MG/ML
INJECTION INTRAMUSCULAR; INTRAVENOUS PRN
Status: DISCONTINUED | OUTPATIENT
Start: 2022-09-14 | End: 2022-09-14 | Stop reason: SDUPTHER

## 2022-09-14 RX ORDER — ACETAMINOPHEN 500 MG
1000 TABLET ORAL ONCE
Status: DISCONTINUED | OUTPATIENT
Start: 2022-09-14 | End: 2022-09-14 | Stop reason: SDUPTHER

## 2022-09-14 RX ORDER — SODIUM CHLORIDE 9 MG/ML
INJECTION, SOLUTION INTRAVENOUS PRN
Status: DISCONTINUED | OUTPATIENT
Start: 2022-09-14 | End: 2022-09-14 | Stop reason: SDUPTHER

## 2022-09-14 RX ORDER — PROMETHAZINE HYDROCHLORIDE 25 MG/1
25 TABLET ORAL EVERY 8 HOURS PRN
Qty: 30 TABLET | Refills: 1 | Status: SHIPPED | OUTPATIENT
Start: 2022-09-14

## 2022-09-14 RX ORDER — SODIUM CHLORIDE 0.9 % (FLUSH) 0.9 %
5-40 SYRINGE (ML) INJECTION EVERY 12 HOURS SCHEDULED
Status: DISCONTINUED | OUTPATIENT
Start: 2022-09-14 | End: 2022-09-14 | Stop reason: SDUPTHER

## 2022-09-14 RX ORDER — ACETAMINOPHEN 500 MG
1000 TABLET ORAL EVERY 6 HOURS
Status: DISCONTINUED | OUTPATIENT
Start: 2022-09-14 | End: 2022-09-14 | Stop reason: HOSPADM

## 2022-09-14 RX ORDER — SODIUM CHLORIDE 9 MG/ML
INJECTION, SOLUTION INTRAVENOUS PRN
Status: DISCONTINUED | OUTPATIENT
Start: 2022-09-14 | End: 2022-09-14 | Stop reason: HOSPADM

## 2022-09-14 RX ORDER — FENTANYL CITRATE 50 UG/ML
100 INJECTION, SOLUTION INTRAMUSCULAR; INTRAVENOUS
Status: COMPLETED | OUTPATIENT
Start: 2022-09-14 | End: 2022-09-14

## 2022-09-14 RX ORDER — ONDANSETRON 2 MG/ML
INJECTION INTRAMUSCULAR; INTRAVENOUS PRN
Status: DISCONTINUED | OUTPATIENT
Start: 2022-09-14 | End: 2022-09-14 | Stop reason: SDUPTHER

## 2022-09-14 RX ORDER — OXYCODONE HYDROCHLORIDE 5 MG/1
5 TABLET ORAL PRN
Status: DISCONTINUED | OUTPATIENT
Start: 2022-09-14 | End: 2022-09-14 | Stop reason: HOSPADM

## 2022-09-14 RX ORDER — OXYCODONE HYDROCHLORIDE 5 MG/1
10 TABLET ORAL PRN
Status: DISCONTINUED | OUTPATIENT
Start: 2022-09-14 | End: 2022-09-14 | Stop reason: HOSPADM

## 2022-09-14 RX ORDER — MIDAZOLAM HYDROCHLORIDE 2 MG/2ML
2 INJECTION, SOLUTION INTRAMUSCULAR; INTRAVENOUS
Status: COMPLETED | OUTPATIENT
Start: 2022-09-14 | End: 2022-09-14

## 2022-09-14 RX ORDER — TADALAFIL 5 MG/1
5 TABLET ORAL PRN
Status: DISCONTINUED | OUTPATIENT
Start: 2022-09-14 | End: 2022-09-14

## 2022-09-14 RX ORDER — MIDAZOLAM HYDROCHLORIDE 1 MG/ML
INJECTION INTRAMUSCULAR; INTRAVENOUS PRN
Status: DISCONTINUED | OUTPATIENT
Start: 2022-09-14 | End: 2022-09-14 | Stop reason: SDUPTHER

## 2022-09-14 RX ORDER — PROPOFOL 10 MG/ML
INJECTION, EMULSION INTRAVENOUS PRN
Status: DISCONTINUED | OUTPATIENT
Start: 2022-09-14 | End: 2022-09-14 | Stop reason: SDUPTHER

## 2022-09-14 RX ORDER — METHOCARBAMOL 750 MG/1
750 TABLET, FILM COATED ORAL 3 TIMES DAILY PRN
Qty: 40 TABLET | Refills: 1 | Status: SHIPPED | OUTPATIENT
Start: 2022-09-14

## 2022-09-14 RX ORDER — DIPHENHYDRAMINE HCL 25 MG
25 CAPSULE ORAL EVERY 6 HOURS PRN
Status: DISCONTINUED | OUTPATIENT
Start: 2022-09-14 | End: 2022-09-14 | Stop reason: HOSPADM

## 2022-09-14 RX ORDER — SODIUM CHLORIDE 0.9 % (FLUSH) 0.9 %
5-40 SYRINGE (ML) INJECTION PRN
Status: DISCONTINUED | OUTPATIENT
Start: 2022-09-14 | End: 2022-09-14 | Stop reason: SDUPTHER

## 2022-09-14 RX ORDER — SODIUM CHLORIDE, SODIUM LACTATE, POTASSIUM CHLORIDE, CALCIUM CHLORIDE 600; 310; 30; 20 MG/100ML; MG/100ML; MG/100ML; MG/100ML
INJECTION, SOLUTION INTRAVENOUS CONTINUOUS
Status: DISCONTINUED | OUTPATIENT
Start: 2022-09-14 | End: 2022-09-14 | Stop reason: HOSPADM

## 2022-09-14 RX ORDER — ONDANSETRON 2 MG/ML
4 INJECTION INTRAMUSCULAR; INTRAVENOUS
Status: DISCONTINUED | OUTPATIENT
Start: 2022-09-14 | End: 2022-09-14 | Stop reason: HOSPADM

## 2022-09-14 RX ORDER — KETOROLAC TROMETHAMINE 30 MG/ML
INJECTION, SOLUTION INTRAMUSCULAR; INTRAVENOUS PRN
Status: DISCONTINUED | OUTPATIENT
Start: 2022-09-14 | End: 2022-09-14 | Stop reason: ALTCHOICE

## 2022-09-14 RX ORDER — HYDROMORPHONE HYDROCHLORIDE 1 MG/ML
0.5 INJECTION, SOLUTION INTRAMUSCULAR; INTRAVENOUS; SUBCUTANEOUS EVERY 5 MIN PRN
Status: DISCONTINUED | OUTPATIENT
Start: 2022-09-14 | End: 2022-09-14 | Stop reason: HOSPADM

## 2022-09-14 RX ORDER — LIDOCAINE HYDROCHLORIDE 10 MG/ML
1 INJECTION, SOLUTION INFILTRATION; PERINEURAL
Status: DISCONTINUED | OUTPATIENT
Start: 2022-09-14 | End: 2022-09-14 | Stop reason: HOSPADM

## 2022-09-14 RX ORDER — TRANEXAMIC ACID 100 MG/ML
INJECTION, SOLUTION INTRAVENOUS PRN
Status: DISCONTINUED | OUTPATIENT
Start: 2022-09-14 | End: 2022-09-14 | Stop reason: SDUPTHER

## 2022-09-14 RX ORDER — ROPIVACAINE HYDROCHLORIDE 2 MG/ML
INJECTION, SOLUTION EPIDURAL; INFILTRATION; PERINEURAL PRN
Status: DISCONTINUED | OUTPATIENT
Start: 2022-09-14 | End: 2022-09-14 | Stop reason: ALTCHOICE

## 2022-09-14 RX ORDER — DIPHENHYDRAMINE HYDROCHLORIDE 50 MG/ML
25 INJECTION INTRAMUSCULAR; INTRAVENOUS EVERY 6 HOURS PRN
Status: DISCONTINUED | OUTPATIENT
Start: 2022-09-14 | End: 2022-09-14 | Stop reason: HOSPADM

## 2022-09-14 RX ORDER — ONDANSETRON 4 MG/1
4 TABLET, ORALLY DISINTEGRATING ORAL EVERY 8 HOURS PRN
Status: DISCONTINUED | OUTPATIENT
Start: 2022-09-14 | End: 2022-09-14 | Stop reason: HOSPADM

## 2022-09-14 RX ORDER — ATORVASTATIN CALCIUM 10 MG/1
10 TABLET, FILM COATED ORAL DAILY
Status: DISCONTINUED | OUTPATIENT
Start: 2022-09-15 | End: 2022-09-14 | Stop reason: HOSPADM

## 2022-09-14 RX ORDER — ONDANSETRON 2 MG/ML
4 INJECTION INTRAMUSCULAR; INTRAVENOUS EVERY 6 HOURS PRN
Status: DISCONTINUED | OUTPATIENT
Start: 2022-09-14 | End: 2022-09-14 | Stop reason: HOSPADM

## 2022-09-14 RX ADMIN — ONDANSETRON 4 MG: 2 INJECTION INTRAMUSCULAR; INTRAVENOUS at 08:24

## 2022-09-14 RX ADMIN — Medication 5 MG: at 10:00

## 2022-09-14 RX ADMIN — OXYCODONE 10 MG: 5 TABLET ORAL at 15:17

## 2022-09-14 RX ADMIN — ROPIVACAINE HYDROCHLORIDE 20 ML: 2 INJECTION, SOLUTION EPIDURAL; INFILTRATION at 07:35

## 2022-09-14 RX ADMIN — Medication 2 G: at 08:42

## 2022-09-14 RX ADMIN — FENTANYL CITRATE 50 MCG: 50 INJECTION, SOLUTION INTRAMUSCULAR; INTRAVENOUS at 07:35

## 2022-09-14 RX ADMIN — MIDAZOLAM 2 MG: 1 INJECTION INTRAMUSCULAR; INTRAVENOUS at 08:35

## 2022-09-14 RX ADMIN — SODIUM CHLORIDE, SODIUM LACTATE, POTASSIUM CHLORIDE, AND CALCIUM CHLORIDE: 600; 310; 30; 20 INJECTION, SOLUTION INTRAVENOUS at 06:48

## 2022-09-14 RX ADMIN — TRANEXAMIC ACID 1000 MG: 100 INJECTION, SOLUTION INTRAVENOUS at 08:48

## 2022-09-14 RX ADMIN — Medication 10 MG: at 09:24

## 2022-09-14 RX ADMIN — DEXAMETHASONE SODIUM PHOSPHATE 10 MG: 10 INJECTION INTRAMUSCULAR; INTRAVENOUS at 08:24

## 2022-09-14 RX ADMIN — GLYCOPYRROLATE 0.1 MG: 0.2 INJECTION, SOLUTION INTRAMUSCULAR; INTRAVENOUS at 09:00

## 2022-09-14 RX ADMIN — PROPOFOL 40 MG: 10 INJECTION, EMULSION INTRAVENOUS at 08:45

## 2022-09-14 RX ADMIN — SODIUM CHLORIDE, SODIUM LACTATE, POTASSIUM CHLORIDE, AND CALCIUM CHLORIDE: 600; 310; 30; 20 INJECTION, SOLUTION INTRAVENOUS at 09:03

## 2022-09-14 RX ADMIN — LIDOCAINE HYDROCHLORIDE 100 MG: 20 INJECTION, SOLUTION EPIDURAL; INFILTRATION; INTRACAUDAL; PERINEURAL at 08:45

## 2022-09-14 RX ADMIN — Medication 10 MG: at 09:11

## 2022-09-14 RX ADMIN — MIDAZOLAM HYDROCHLORIDE 2 MG: 1 INJECTION, SOLUTION INTRAMUSCULAR; INTRAVENOUS at 07:34

## 2022-09-14 RX ADMIN — Medication 10 MG: at 08:52

## 2022-09-14 RX ADMIN — PROPOFOL 75 MCG/KG/MIN: 10 INJECTION, EMULSION INTRAVENOUS at 08:46

## 2022-09-14 RX ADMIN — MEPIVACAINE HYDROCHLORIDE 60 MG: 20 INJECTION, SOLUTION EPIDURAL; INFILTRATION at 08:35

## 2022-09-14 RX ADMIN — ACETAMINOPHEN 1000 MG: 500 TABLET, FILM COATED ORAL at 06:48

## 2022-09-14 ASSESSMENT — PAIN SCALES - GENERAL
PAINLEVEL_OUTOF10: 0
PAINLEVEL_OUTOF10: 0

## 2022-09-14 NOTE — PROGRESS NOTES
Had therapy. Voiding. Medicated for pain with oxycodone. Prescriptions were sent to pharmacy. Has follow up appt scheduled with Dr Ivelisse Potts. Home therapy arranged. Instructed to call if having fever or excessive drainage. Instructions given. Going to car via wheelchair.

## 2022-09-14 NOTE — DISCHARGE INSTRUCTIONS
[facility]   Patient Discharge Instructions    Lora Camilo / [id number] : 1945    Admitted 2022 Discharged: 2022     IF YOU HAVE ANY PROBLEMS ONCE YOU ARE AT HOME CALL THE FOLLOWING NUMBERS:   Nurse's line: [phone]  Main office number: [phone]      Medications    The medications you are to continue on are listed on the medication reconciliation sheet. Narcotic pain medications as well as supplemental iron can cause constipation. If this occurs, try over the counter stool softeners or try stopping the narcotic pain medication and/or the iron. It is important that you take the medication exactly as they are prescribed. Medications which increase your risk of blood clots are listed to stop for 5 weeks after surgery as well as medications or supplements which increase your risk of bleeding complications. Keep your medication in the bottles provided by the pharmacist and keep a list of the medication names, dosages, and times to be taken in your wallet. Do not take other medications without consulting your doctor. If you need a refill on your pain medication, please note our office is closed over the weekend. It is our office policy that on-call providers cannot refill narcotic pain medications over the weekend. If you will need a refill over the weekend, please call our office before 12pm on Friday or first thing Monday morning. Important Information    Do NOT smoke as this will greatly increase your risk of infection! Resume your prehospital diet. If you have excessive nausea or vomitting call your doctor's office     Leg swelling and warmth is normal for 6 months after surgery. If you experience swelling in your leg, elevate your leg while laying down with your toes above your heart. If you have sudden onset severe swelling with leg pain call our office. The stitches deep inside take approximately 6 months to dissolve.  There will be sharp shooting, stinging and burning pain. This is normal and will resolve between 3-6 months after surgery. Difficulty sleeping is normal following total Knee and Hip replacement. You may try melatonin, an over-the-counter sleep aid or benadryl to help with sleep. Most patients will resume sleeping through the night 8 weeks after surgery. Home Physical Therapy is arranged. Home Health will contact you within 48 hrs of discharge that you have chosen. If you have not received a call within this time frame please contact that provider you chose. You should be given this information before you leave the hospital.     You are at a risk for falls. Use the rolling walker when walking. Patients who have had a joint replacement should not drive if they are still taking narcotic pain mediation during the daytime hours. Most patients wean themselves off of pain medication within 2-5 weeks after surgery. You may drive once you discontinue the narcotic pain medication and feel comfortable enough going from gas to break while driving with your right leg. When to Call the office    - If you have a temperature greater then 101 + other symptoms that suggest illness such as nausea/vomiting, altered mental status, extreme fatigue, wound drainage, etc.  - Uncontrolled vomiting   - Loose control of your bladder or bowel function  - Are unable to bear any wieght          Total Knee Replacement: What to Expect at  [address] had a total knee replacement. The doctor replaced the worn ends of the bones that connect to your knee (thighbone and lower leg bone) with plastic andmetal parts. When you leave the hospital, you should be able to move around with a walker or crutches. But you will need someone to help you at home until you have moreenergy and can move around better. You will go home with a bandage and stitches, staples, skin glue, or tape strips. Change the bandage as your doctor tells you to.  If you strenuous activities with caution. You can golf, but you may want to use a golf cart for some time. And don't wear shoes with spikes. You can bike on a flat road or on a stationary bike. Talk to your doctor before biking uphill. Your doctor may suggest that you stay away from activities that put stress on your knee. These include tennis, badminton, contact sports like football, jumping (such as in basketball), jogging, and running. Avoid activities where you might fall. Do not sit for more than 1 hour at a time. Get up and walk around for a while before you sit again. If you must sit for a long time, prop up your leg with a chair or footstool. This will help you avoid swelling. Ask your doctor when you can drive again. It may take several weeks after knee replacement surgery before it's safe for you to drive. When you get into a car, sit on the edge of the seat. Then pull in your legs, and turn to face the front. You should be able to do many everyday activities 3 to 6 weeks after your surgery. You will probably need to take 4 to 16 weeks off from work. When you can go back to work depends on the type of work you do and how you feel. Ask your doctor when it is okay for you to have sex. For 12 weeks, do not lift anything heavier than 10 pounds and do not lift weights. Diet    By the time you leave the hospital, you should be eating your normal diet. If your stomach is upset, try bland, low-fat foods like plain rice, broiled chicken, toast, and yogurt. Your doctor may suggest that you take iron and vitamin supplements. Drink plenty of fluids (unless your doctor tells you not to). Eat healthy foods, and watch your portion sizes. Try to stay at your ideal weight. Too much weight puts more stress on your new knee. You may notice that your bowel movements are not regular right after your surgery. This is common. Try to avoid constipation and straining with bowel movements. Drinking enough fluids, taking a stool softener, and eating foods that are good sources of fiber can help you avoid constipation. If you have not had a bowel movement after a couple of days, talk to your doctor. Medicines    Your doctor will tell you if and when you can restart your medicines. You will also get instructions about taking any new medicines. If you take aspirin or some other blood thinner, ask your doctor if and when to start taking it again. Make sure that you understand exactly what your doctor wants you to do. Your doctor may give you a blood-thinning medicine to prevent blood clots. If you take a blood thinner, be sure you get instructions about how to take your medicine safely. Blood thinners can cause serious bleeding problems. This medicine could be in pill form or as a shot (injection). If a shot is needed, your doctor will tell you how to do this. Be safe with medicines. Take pain medicines exactly as directed. If the doctor gave you a prescription medicine for pain, take it as prescribed. If you are not taking a prescription pain medicine, ask your doctor if you can take an over-the-counter medicine. Plan to take your pain medicine 30 minutes before exercises. It is easier to prevent pain before it starts than to stop it after it has started. If you think your pain medicine is making you sick to your stomach: Take your medicine after meals (unless your doctor has told you not to). Ask your doctor for a different pain medicine. If your doctor prescribed antibiotics, take them as directed. Do not stop taking them just because you feel better. You need to take the full course of antibiotics. Incision care    If your doctor told you how to care for your cut (incision), follow your doctor's instructions. You will have a dressing over the cut. A dressing helps the incision heal and protects it. Your doctor will tell you how to take care of this.      If you did not get instructions, follow this general advice: If you have strips of tape on the cut the doctor made, leave the tape on for a week or until it falls off. If you have stitches or staples, your doctor will tell you when to come back to have them removed. If you have skin glue on the cut, leave it on until it falls off. Skin glue is also called skin adhesive or liquid stitches. Change the bandage every day. Wash the area daily with warm water, and pat it dry. Don't use hydrogen peroxide or alcohol. They can slow healing. You may cover the area with a gauze bandage if it oozes fluid or rubs against clothing. You may shower 24 to 48 hours after surgery. Pat the incision dry. Don't swim or take a bath for the first 2 weeks, or until your doctor tells you it is okay. Exercise    Your rehab program will give you a number of exercises to do to help you get back your knee's range of motion and strength. Always do them as your therapist tells you. Ice    For pain and swelling, put ice or a cold pack on the area for 10 to 20 minutes at a time. Put a thin cloth between the ice and your skin. If your doctor recommended cold therapy using a portable machine, follow the instructions that came with the machine. Other instructions    Wear compression stockings if your doctor told you to. These may help to prevent blood clots. Your doctor will tell you how long you need to keep wearing the compression stockings. Carry a medical alert card that says you have an artificial joint. You have metal pieces in your knee. These may set off some airport metal detectors. Follow-up care is a key part of your treatment and safety. Be sure to make and go to all appointments, and call your doctor if you are having problems. It's also a good idea to know your test results and keep alist of the medicines you take. When should you call for help? Call 911 anytime you think you may need emergency care.  For example, call if:    You passed out (lost consciousness). You have severe trouble breathing. You have sudden chest pain and shortness of breath, or you cough up blood. Call your doctor now or seek immediate medical care if:    You have signs of infection, such as: Increased pain, swelling, warmth, or redness. Red streaks leading from the incision. Pus draining from the incision. A fever. You have signs of a blood clot, such as:  Pain in your calf, back of the knee, thigh, or groin. Redness and swelling in your leg or groin. Your incision comes open and begins to bleed, or the bleeding increases. You have pain that does not get better after you take pain medicine. Watch closely for changes in your health, and be sure to contact your doctor if:    You do not have a bowel movement after taking a laxative. Where can you learn more? Go to https://Spare Backup.Rheonix. org and sign in to your [employer] account. Enter J094 in the Qualtr[employer]© box to learn more about \"Total Knee Replacement: What to Expect at Home. \"     If you do not have an account, please click on the \"Sign Up Now\" link. Current as of: March 9, 2022               Content Version: 13.3  © 2006-2022 [facility]. Care instructions adapted under license by [facility]"[employer] [facility] ([facility]). If you have questions about a medical condition or this instruction, always ask your healthcare professional. [place] [address] any warranty or liability for your use of this information. Information obtained by :  I understand that if any problems occur once I am at home I am to contact my physician. I understand and acknowledge receipt of the instructions indicated above.                                                                                                                                            Physician's or R.N.'s Signature                                                                  Date/Time Patient or Representative Signature                                                          Date/Time

## 2022-09-14 NOTE — ANESTHESIA PROCEDURE NOTES
Peripheral Block    Patient location during procedure: pre-op  Reason for block: post-op pain management and at surgeon's request  Start time: 9/14/2022 7:35 AM  End time: 9/14/2022 7:37 AM  Staffing  Performed: anesthesiologist   Anesthesiologist: Rafael Simental MD  Preanesthetic Checklist  Completed: patient identified, IV checked, site marked, risks and benefits discussed, surgical/procedural consents, equipment checked, pre-op evaluation, timeout performed, anesthesia consent given, oxygen available and monitors applied/VS acknowledged  Peripheral Block   Patient position: supine  Prep: ChloraPrep  Provider prep: mask and sterile gloves  Patient monitoring: cardiac monitor, continuous pulse ox, frequent blood pressure checks, IV access, oxygen and responsive to questions  Block type: Femoral  Adductor canal  Laterality: right  Injection technique: single-shot  Guidance: nerve stimulator and ultrasound guided    Needle   Needle type: insulated echogenic nerve stimulator needle   Needle gauge: 20 G  Needle localization: anatomical landmarks, nerve stimulator and ultrasound guidance  Assessment   Injection assessment: negative aspiration for heme, no paresthesia on injection, local visualized surrounding nerve on ultrasound and no intravascular symptoms  Slow fractionated injection: yes  Hemodynamics: stable  Real-time US image taken/store: yes  Outcomes: patient tolerated procedure well    Additional Notes  Local anesthetic visualized surrounding the nerve with neural structure intact. Permanent image stored on chart.   0.2% Ropivacaine with epi 1:200,000 20 ml + Decadron 4 mg    Medications Administered  EPINEPHrine PF 1 MG/ML Soln, ropivacaine 0.2% Soln (Mixture components: EPINEPHrine PF 1 MG/ML Soln, 0.005 mL; ropivacaine 0.2% Soln, 0.1 mL) - Perineural   20 mL - 9/14/2022 7:35:00 AM  dexamethasone 4 MG/ML - Perineural   4 mg - 9/14/2022 7:35:00 AM

## 2022-09-14 NOTE — PROGRESS NOTES
PHYSICAL THERAPY JOINT CAMP: TOTAL KNEE ARTHROPLASTY Initial Assessment, Daily Note, and PM  (Link to Caseload Tracking: PT Visit Days : 1  Acknowledge Orders  Time In/Out  PT Charge Capture  Rehab Caseload Tracker  Episode   Karma Núñez is a 78 y.o. male   PRIMARY DIAGNOSIS: Arthritis of knee, right  Arthritis of knee, right [M17.11]  Unilateral primary osteoarthritis, right knee [M17.11]  Procedure(s) (LRB):  rt KNEE TOTAL ARTHROPLASTY ROBOTIC/ SDD (Right)  Day of Surgery  Reason for Referral: Pain in Right Knee (M25.561)  Stiffness of Right Knee, Not elsewhere classified (M25.661)  Difficulty in walking, Not elsewhere classified (R26.2)  Outpatient in a bed: Payor: MEDICARE / Plan: MEDICARE PART A AND B / Product Type: *No Product type* /     REHAB RECOMMENDATIONS:   Recommendation to date pending progress:  Setting:  Home Health Therapy    Equipment:    Rolling Walker     RANGE OF MOTION:   Right Knee Flexion: R Knee Flexion 0-145: 4  Right Knee Extension: R Knee Extension 0: 102     GAIT: I Mod I S SBA CGA Min Mod Max Total  NT x2 Comments:   Level of Assistance [] [] [] [] [x] [] [] [] [] [] []            Weightbearing Status  Right Lower Extremity Weight Bearing: Weight Bearing As Tolerated  Left Lower Extremity Weight Bearing: Weight Bearing As Tolerated    Distance  300 feet    Gait Quality Antalgic    DME Rolling Walker     Stairs Stairs/Curb  Stairs?: Yes  Stairs  # Steps : 4  Device: Single pt cane  Assistance: Contact guard assistance    Ramp     I=Independent, Mod I=Modified Independent, S=Supervision, SBA=Standby Assistance, CGA=Contact Guard Assistance,   Min=Minimal Assistance, Mod=Moderate Assistance, Max=Maximal Assistance, Total=Total Assistance, NT=Not Tested    ASSESSMENT:   ASSESSMENT:  Mr. Leny Breaux is a 78 y.o. male POD #0 s/p R TKA. Upon PT evaluation, pt exhibits expected post-operative strength, balance, and ROM deficits resulting in limited independence with functional mobility. At baseline, pt was independent with all mobility. Pt is now requiring supervision for transfers and ambulation x 300' with RW and CGA for stair negotiation with SPC. Required increased time for answering pt/family questions at end of session. Pt will require HHPT and RW at discharge. Pt will continue to benefit from skilled PT to address above impairments and maximize functional independence prior to discharge. .     Outcome Measure:   KOOS-JR:   KOJr SEVERO. Knee Survey Score: 12    SUBJECTIVE:   Mr. Swan Notice states, \"I'm ready to get up and walk. \"     Home Environment/Prior Level of Function Lives With: Spouse  Type of Home: House  Home Layout: Two level, Able to Live on Main level with bedroom/bathroom  Home Access: Stairs to enter without rails  Entrance Stairs - Number of Steps: 2  Bathroom Shower/Tub: Walk-in shower  Bathroom Equipment: 3-in-1 commode    OBJECTIVE:     PAIN: VITAL SIGNS: LINES/DRAINS:   Pre Treatment:  Pain Assessment: 0-10  Patient's Stated Pain Goal: 5      Post Treatment: 5 Vitals        Oxygen  O2 Therapy: Room air IV    RESTRICTIONS/PRECAUTIONS:  Restrictions/Precautions: Fall Risk, Weight Bearing  Right Lower Extremity Weight Bearing: Weight Bearing As Tolerated  Left Lower Extremity Weight Bearing: Weight Bearing As Tolerated        Restrictions/Precautions: Fall Risk, Weight Bearing        LOWER EXTREMITY GROSS EVALUATION:  RIGHT LE   Within Functional Limits   Abnormal   Comments   Strength [] [x]  R knee 2/5   Range of Motion [] [x] AROM RLE (degrees)  RLE AROM: Exceptions  R Knee Flexion 0-145: 4  R Knee Extension 0: 102      LEFT LE Within Functional Limits   Abnormal   Comments   Strength [x] []     Range of Motion [x] []       UPPER EXTREMITY GROSS EVALUATION:     Within Functional Limits   Abnormal   Comments   Strength [x] []    Range of Motion [x] []      SENSATION  Sensation [x]       COGNITION/  PERCEPTION: Intact Impaired (Comments):   Orientation [x]     Vision [x] Hearing [x]     Cognition  [x]       MOBILITY: I Mod I S SBA CGA Min Mod Max Total  NT x2 Comments:   Bed Mobility    Rolling [] [] [] [] [] [] [] [] [] [x] []    Supine to Sit [] [] [] [] [] [] [] [] [] [x] []    Scooting [] [] [x] [] [] [] [] [] [] [] []    Sit to Supine [] [] [] [] [] [] [] [] [] [x] []    Transfers    Sit to Stand [] [] [x] [] [] [] [] [] [] [] []    Bed to Chair [] [] [x] [] [] [] [] [] [] [] []    Stand to Sit [] [] [x] [] [] [] [] [] [] [] []    Stand Pivot [] [] [x] [] [] [] [] [] [] [] []    Toilet [] [] [] [] [] [] [] [] [] [] []     [] [] [] [] [] [] [] [] [] [] []    I=Independent, Mod I=Modified Independent, S=Supervision, SBA=Standby Assistance, CGA=Contact Guard Assistance,   Min=Minimal Assistance, Mod=Moderate Assistance, Max=Maximal Assistance, Total=Total Assistance, NT=Not Tested    BALANCE: Good Fair+ Fair Fair- Poor NT Comments   Sitting Static [x] [] [] [] [] []    Sitting Dynamic [x] [] [] [] [] []              Standing Static [] [x] [] [] [] []    Standing Dynamic [] [] [x] [] [] []      PLAN:   ACUTE PHYSICAL THERAPY GOALS:   (Developed with and agreed upon by patient and/or caregiver.)  GOALS (1-4 days):  (1.)Mr. Ramo Parrish will move from supine to sit and sit to supine  in bed with MODIFIED INDEPENDENCE. (2.)Mr. Ramo Parrish will transfer from bed to chair and chair to bed with MODIFIED INDEPENDENCE using the least restrictive device. (3.)Mr. Ramo Parrish will ambulate with MODIFIED INDEPENDENCE for 250 feet with the least restrictive device. (4.)Mr. Ramo Parrish will ambulate up/down 2 steps with left railing with MODIFIED INDEPENDENCE using straight cane. (5.)Mr. Ramo Parrish will increase left knee ROM to 0-115°.       FREQUENCY AND DURATION: BID for duration of hospital stay or until stated goals are met, whichever comes first.    THERAPY PROGNOSIS: Good    PROBLEM LIST:   (Skilled intervention is medically necessary to address:)  Decreased ADL/Functional Activities, Decreased Activity Tolerance, Decreased AROM/PROM, Decreased Gait Ability, Decreased Strength, Decreased Transfer Abilities, and Increased Pain   INTERVENTIONS PLANNED:   (Benefits and precautions of physical therapy have been discussed with the patient.)  Therapeutic Activity, Therapeutic Exercise/HEP, Gait Training, Modalities, and Education       TREATMENT:   EVALUATION: LOW COMPLEXITY: (Untimed Charge)    TREATMENT:   Co-Treatment PT/OT necessary due to patient's decreased overall endurance/tolerance levels, as well as need for high level skilled assistance to complete functional transfers/mobility and functional tasks  Therapeutic Activity (30 Minutes): Therapeutic activity included Scooting, Lateral Scooting, Transfer Training, Ambulation on level ground, Sitting balance , Standing balance, and education to improve functional Activity tolerance, Balance, Mobility, Strength, and safe discharge. Therapeutic Exercise (10 Minutes): Therapeutic exercises noted below to improve functional activity tolerance, AROM, strength, and mobility.      TREATMENT GRID:  THERAPEUTIC  EXERCISES: DATE:  9/14 DATE:   DATE:      AM PM AM PM AM PM    [] [x] [] [] [] []   Ankle Pumps  10       Quad Sets  10       Gluteal Sets  10       Hip Abd/ADduction  10       Straight Leg Raises  10       Knee Slides  10       Short Arc Quads  10       Chair Slides  10                         B = bilateral; AA = active assistive; A = active; P = passive      EDUCATION: Education Given To: Patient  Education Provided: Role of Therapy, Plan of Care, Home Exercise Program, ADL Adaptive Strategies, Precautions, Transfer Training, Family Education, Equipment, Fall Prevention Strategies  Education Method: Demonstration, Verbal, Teach Back  Barriers to Learning: None  Education Outcome: Verbalized understanding, Demonstrated understanding  EDUCATION:  [x] Home Exercises  [x] Fall Precautions  [x] No Pillow Under Knee  [x] D/C Instruction Review [x] Cryocuff  [x] Naveed Marx Management/Safety  [x] Adaptive Equipment as Needed     AFTER TREATMENT PRECAUTIONS: Bed/Chair Locked, Call light within reach, Chair, Heels floated, Needs within reach, RN notified, and Visitors at bedside    INTERDISCIPLINARY COLLABORATION:  RN/ PCT, PT/ PTA, and OT/ WILHELM    COMPLIANCE WITH PROGRAM/EXERCISE: Will assess as treatment progresses. RECOMMENDATIONS/INTENT FOR NEXT TREATMENT SESSION: Treatment next visit will focus on increasing Mr. Roscoe Mar independence with bed mobility, transfers, gait training, strength/ROM exercises, modalities for pain, and patient education.      TIME IN/OUT:  Time In: 1405  Time Out: 1450  Minutes: [address], PT

## 2022-09-14 NOTE — PROGRESS NOTES
OCCUPATIONAL THERAPY Initial Assessment and PM      (Link to Caseload Tracking: OT Visit Days: 1  OT Orders   Time  OT Charge Capture  Rehab Caseload Tracker  Episode     Cary Solis is a 78 y.o. male   PRIMARY DIAGNOSIS: Arthritis of knee, right  Arthritis of knee, right [M17.11]  Unilateral primary osteoarthritis, right knee [M17.11]  Procedure(s) (LRB):  rt KNEE TOTAL ARTHROPLASTY ROBOTIC/ SDD (Right)  Day of Surgery  Reason for Referral: Pain in Right Knee (M25.561)  Stiffness of Right Knee, Not elsewhere classified (M25.661)  Other lack of cordination (R27.8)  Difficulty in walking, Not elsewhere classified (R26.2)  Other abnormalities of gait and mobility (R26.89)  Outpatient in a bed: Payor: MEDICARE / Plan: MEDICARE PART A AND B / Product Type: *No Product type* /     ASSESSMENT:     REHAB RECOMMENDATIONS:   Recommendation to date pending progress:  Setting:  Home Health Therapy    Equipment:    None     ASSESSMENT:  Mr. Hieu Crowder is s/p right TKA and presents with decreased independence with functional mobility and activities of daily living as compared to baseline level of function and safety. Patient would benefit from skilled Occupational Therapy to maximize independence and safety with self-care task and functional mobility. Patient supine in bed brief wet. He transferred to EOB with SBA. He stood to use the urinal with CGA. He washed leslie areas with CGA. He sat on EOB and donned his clothes with min assist. He stood and completed clothing management with CGA. He ambulated in the hallway with CGA to SBA. He performed steps with PT. His wife was present. He returned to his room and was educated on post op day 1 ADL task performance. He would like to discharge home today. Will follow.        [address] [facility] [address] [address] AM-PAC 6 Clicks Daily Activity Inpatient Short Form:    AM-PAC Daily Activity Inpatient   How much help for putting on and taking off regular lower body clothing?: A Little  How much help for Bathing?: A Little  How much help for Toileting?: A Little  How much help for putting on and taking off regular upper body clothing?: None  How much help for taking care of personal grooming?: None  How much help for eating meals?: None  AM-PAC Inpatient Daily Activity Raw Score: 21  AM-PAC Inpatient ADL T-Scale Score : 44.27  ADL Inpatient CMS 0-100% Score: 32.79  ADL Inpatient CMS G-Code Modifier : CJ     SUBJECTIVE:     Mr. Lisbeth Torres stated he would like to go home today       Social/Functional   Lives With: Spouse  Type of Home: House  Home Layout: Two level, Able to Live on Main level with bedroom/bathroom  Home Access: Stairs to enter without rails  Entrance Stairs - Number of Steps: 2  Bathroom Shower/Tub: Walk-in shower  Bathroom Equipment: 3-in-1 commode    OBJECTIVE:     Viktoriya Laughter / Tay Reid / [place]: NA    RESTRICTIONS/PRfaECAUTIONS:   fall    PAIN: Cresencio Muskrat / O2:   Pre Treatment:      3/10    Post Treatment: 3/10 Vitals          Oxygen        GROSS EVALUATION: INTACT IMPAIRED   (See Comments)   UE AROM [x] []   UE PROM [] []   Strength [x]       Posture / Balance []  CGA   Sensation []     Coordination []  CGA     Tone []       Edema []    Activity Tolerance []  Fair+ with mobility     Hand Dominance R [] L []      COGNITION/  PERCEPTION: INTACT IMPAIRED   (See Comments)   Orientation [x]     Vision [x]     Hearing [x]     Cognition  [x]     Perception [x]       MOBILITY: I Mod I S SBA CGA Min Mod Max Total  NT x2 Comments:   Bed Mobility    Rolling [] [] [] [] [] [] [] [] [] [] []    Supine to Sit [] [] [] [x] [] [] [] [] [] [] []    Scooting [] [] [] [x] [] [] [] [] [] [] []    Sit to Supine [] [] [] [] [] [] [] [] [] [] []    Transfers    Sit to Stand [] [] [] [] [x] [] [] [] [] [] []    Bed to Chair [] [] [] [] [x] [] [] [] [] [] []    Stand to Sit [] [] [] [] [x] [] [] [] [] [] []    Tub/Shower [] [] [] [] [] [] [] [] [] [] []       Toilet [] [] [] [] [] [] [] [] [] [] []        [] [] [] [] [] [] [] [] [] [] []    I=Independent, Mod I=Modified Independent, S=Supervision/Setup, SBA=Standby Assistance, CGA=Contact Guard Assistance, Min=Minimal Assistance, Mod=Moderate Assistance, Max=Maximal Assistance, Total=Total Assistance, NT=Not Tested    ACTIVITIES OF DAILY LIVING: I Mod I S SBA CGA Min Mod Max Total NT Comments   BASIC ADLs:              Upper Body Bathing [] [] [] [] [] [] [] [] [] []    Lower Body Bathing [] [] [] [] [] [] [] [] [] []    Toileting [] [] [] [] [x] [] [] [] [] []    Upper Body Dressing [] [] [x] [] [] [] [] [] [] []    Lower Body Dressing [] [] [] [] [] [x] [] [] [] []    Feeding [] [] [] [] [] [] [] [] [] []    Grooming [] [] [x] [] [] [] [] [] [] []    Personal Device Care [] [] [] [] [] [] [] [] [] []    Functional Mobility [] [] [] [] [x] [] [] [] [] []    I=Independent, Mod I=Modified Independent, S=Supervision/Setup, SBA=Standby Assistance, CGA=Contact Guard Assistance, Min=Minimal Assistance, Mod=Moderate Assistance, Max=Maximal Assistance, Total=Total Assistance, NT=Not Tested    PLAN:     FREQUENCY/DURATION   OT Plan of Care: 1 time/week, 2 times/week for duration of hospital stay or until stated goals are met, whichever comes first.    ACUTE OCCUPATIONAL THERAPY GOALS:   (Developed with and agreed upon by patient and/or caregiver.)    GOALS:   DISCHARGE GOALS (in preparation for going home/rehab):  3 days  1. Mr. Hieu Crowder will perform lower body dressing activity with minimal assist required to demonstrate improved functional mobility and safety. -GOAL MET 9/14/22      2. Mr. Hieu Crowder will perform bathing activity with minimal assist required to demonstrate improved functional mobility and safety. 3.  Mr. Hieu Crowder will perform toileting activity with  contact guard assist to demonstrate improved functional mobility and safety. 4.  Mr. Hieu Felt will perform all functional transfers transfer with contact guard assist to demonstrate improved functional mobility and safety.   5.Pt will be able to perform self care with stand by to minimal assistance and ambulate short distances with family that is capable of assisting patient. -GOAL MET 9/14/22         PROBLEM LIST:   (Skilled intervention is medically necessary to address:)  Decreased ADL/Functional Activities  Decreased Balance  Decreased Coordination  Decreased Gait Ability  Decreased Safety Awareness  Decreased Strength  Decreased Transfer Abilities  Increased Pain   INTERVENTIONS PLANNED:   (Benefits and precautions of occupational therapy have been discussed with the patient.)  Self Care Training  Therapeutic Activity  Neuromuscular Re-education  Education         TREATMENT:     EVALUATION: LOW COMPLEXITY: (Untimed Charge)    TREATMENT:   SELF CARE: (25 minutes):   Procedure(s) (per grid) utilized to improve and/or restore self-care/home management as related to dressing, bathing, toileting, grooming, and functional mobility . Required minimal visual, verbal, manual, and tactile cueing to facilitate activities of daily living skills, compensatory activities, and OT poc, post op day 1 ADL task performance .       AFTER TREATMENT PRECAUTIONS: Bed/Chair Locked, Call light within reach, Chair, Needs within reach, RN at bedside, and PT working with patient    INTERDISCIPLINARY COLLABORATION:  RN/ PCT, PT/ PTA, and OT/ WILHELM    EDUCATION:  Education Given To: Patient, Family  Education Provided: Role of Therapy, Plan of Care, Precautions, ADL Adaptive Strategies, Transfer Training  Education Method: Demonstration, Verbal  Barriers to Learning: None  Education Outcome: Verbalized understanding, Demonstrated understanding  [x] Safe And Effective Hygiene  [x] Fall Precautions  [] Hip Precautions  [] D/C Instruction Review [] Prosthesis Review  [x] Walker Management/Safety  [x] Adaptive Equipment as Needed  [x] Therapeutic Resting Position of Joint       TOTAL TREATMENT DURATION AND TIME:  Time In: 1350  Time Out: [id number]  Minutes: Claus Warner OT

## 2022-09-14 NOTE — PERIOP NOTE
TRANSFER - OUT REPORT:    Verbal report given to [facility] on Alf Ramirez  being transferred to [address] [place], [place] [address] for routine post-op       Report consisted of patient's Situation, Background, Assessment and   Recommendations(SBAR). Information from the following report(s) Nurse Handoff Report, Surgery Report, and MAR was reviewed with the receiving nurse. Lines:   Peripheral IV 09/14/22 Right;Posterior Hand (Active)   Site Assessment Clean, dry & intact 09/14/22 1104   Line Status Infusing 09/14/22 1104   Phlebitis Assessment No symptoms 09/14/22 1104   Infiltration Assessment 0 09/14/22 1104   Dressing Status Clean, dry & intact 09/14/22 1104   Dressing Type Transparent 09/14/22 1104        Opportunity for questions and clarification was provided.       Patient transported with:  [employer]

## 2022-09-14 NOTE — ANESTHESIA PRE PROCEDURE
Department of Anesthesiology  Preprocedure Note       Name:  Karl Kelly   Age:  78 y.o.  :  1945                                          MRN:  [id number]         Date:  2022      Surgeon: Alesia Roper):  Scarlet Gomes MD    Procedure: Procedure(s):  rt KNEE TOTAL ARTHROPLASTY ROBOTIC/ SDD    Medications prior to admission:   Prior to Admission medications    Medication Sig Start Date End Date Taking?  Authorizing Provider   Cholecalciferol (VITAMIN D-3) 25 MCG (1000 UT) CAPS Take 1 tablet by mouth every morning    Historical Provider, MD   Omega-3 Fatty Acids (FISH OIL) 1000 MG CAPS Take 1 tablet by mouth daily    Historical Provider, MD   Menaquinone-7 (VITAMIN K2 PO) Take 1 tablet by mouth every morning    Historical Provider, MD   vitamin A 3 MG (82989 UT) capsule Take 1 tablet by mouth every morning    Historical Provider, MD   Coenzyme Q10 (CO Q 10 PO) Take by mouth daily    Historical Provider, MD   meloxicam (MOBIC) 15 MG tablet Take 15 mg by mouth as needed for Pain    Historical Provider, MD   ibuprofen (ADVIL;MOTRIN) 200 MG tablet Take 200 mg by mouth every 6 hours as needed for Pain    Historical Provider, MD   acetaminophen (TYLENOL) 500 MG tablet Take 1,000 mg by mouth every 6 hours as needed    Ar Automatic Reconciliation   calcium carbonate (OS-COCO) 1250 (500 Ca) MG chewable tablet Take 2 tablets by mouth as needed    Ar Automatic Reconciliation   cephALEXin (KEFLEX) 500 MG capsule Take 500 mg by mouth 4 times daily 2/10/22   Ar Automatic Reconciliation   glucosamine-chondroitin 750-600 MG TABS tablet Take by mouth    Ar Automatic Reconciliation   simvastatin (ZOCOR) 20 MG tablet Take 20 mg by mouth nightly 12/21/15   Ar Automatic Reconciliation   tadalafil (CIALIS) 5 MG tablet Take 5 mg by mouth as needed 12/21/15   Ar Automatic Reconciliation       Current medications:    Current Facility-Administered Medications   Medication Dose Route Frequency Provider Last Rate Last Admin  lidocaine 1 % injection 1 mL  1 mL IntraDERmal Once PRN LATONYA Calvo MD        fentaNYL (SUBLIMAZE) injection 100 mcg  100 mcg IntraVENous Once PRN LATONYA Calvo MD        lactated ringers infusion   IntraVENous Continuous LATONYA Calvo MD 75 mL/hr at 09/14/22 0648 New Bag at 09/14/22 0648    sodium chloride flush 0.9 % injection 5-40 mL  5-40 mL IntraVENous 2 times per day LATONYA Calvo MD        sodium chloride flush 0.9 % injection 5-40 mL  5-40 mL IntraVENous PRN LATONYA Calvo MD        0.9 % sodium chloride infusion   IntraVENous PRN LATONYA Calvo MD        midazolam PF (VERSED) injection 2 mg  2 mg IntraVENous Once PRN LATONYA Calvo MD        ceFAZolin (ANCEF) 2000 mg in sterile water 20 mL IV syringe  2,000 mg IntraVENous On Call to [address] [place] [address]           Allergies:  No Known Allergies    Problem List:    Patient Active Problem List   Diagnosis Code    ED (erectile dysfunction) N52.9    Skin cancer, basal cell C44.91    Arthritis M19.90    Prediabetes R73.03    Onychomycosis B35.1    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    Eyelid twitch R25.3    Neck pain M54.2    GERD (gastroesophageal reflux disease) K21.9    Prostate cancer ([address] C61    Mixed hyperlipidemia E78.2    Arthritis of knee, right M17.11    Snoring R06.83    Unilateral primary osteoarthritis, right knee M17.11       Past Medical History:        Diagnosis Date    Arthritis     OA    COVID-19 07/16/2022    mild cough    Erectile dysfunction     GERD (gastroesophageal reflux disease)     seldom, TUMS prn- well controlled    Inguinal hernia     right    Mixed hyperlipidemia     Palpitations     Personal history of malignant neoplasm of prostate     Prostate cancer ([address] 03/2008    radiation 2007, prostatectomy 2008    Skin cancer, basal cell     skin basal cell, shoulder and back       Past Surgical History:        Procedure Laterality Date    COLONOSCOPY      HEENT  01/2022    implant/dental  HERNIA REPAIR  6/4/2013    ORTHOPEDIC SURGERY Right     cheilectomy    PROSTATECTOMY  2008    followed by radiation in 2012       Social History:    Social History     Tobacco Use    Smoking status: Never     Passive exposure: Past (parents and work environment)    Smokeless tobacco: Never   Substance Use Topics    Alcohol use: Yes     Comment: occasionally                                Counseling given: Not Answered      Vital Signs (Current):   Vitals:    09/14/22 0612   BP: (!) 148/90   Pulse: 64   Resp: 18   Temp: 97.5 °F (36.4 °C)   TempSrc: Temporal   SpO2: 99%   Weight: 194 lb 1.6 oz (88 kg)                                              BP Readings from Last 3 Encounters:   09/14/22 (!) 148/90   07/26/22 135/72       NPO Status: Time of last liquid consumption: 2100                        Time of last solid consumption: 2100                        Date of last liquid consumption: 09/13/22                        Date of last solid food consumption: 09/13/22    BMI:   Wt Readings from Last 3 Encounters:   09/14/22 194 lb 1.6 oz (88 kg)   07/26/22 190 lb 6.4 oz (86.4 kg)   03/24/22 190 lb (86.2 kg)     Body mass index is 28.66 kg/m².     CBC:   Lab Results   Component Value Date/Time    WBC 4.2 07/26/2022 08:03 AM    RBC 4.13 07/26/2022 08:03 AM    HGB 13.0 07/26/2022 08:03 AM    HCT 40.1 07/26/2022 08:03 AM    MCV 97.1 07/26/2022 08:03 AM    RDW 12.9 07/26/2022 08:03 AM     07/26/2022 08:03 AM       CMP:   Lab Results   Component Value Date/Time     07/26/2022 08:03 AM    K 4.1 07/26/2022 08:03 AM     07/26/2022 08:03 AM    CO2 30 07/26/2022 08:03 AM    BUN 17 07/26/2022 08:03 AM    CREATININE 0.96 07/26/2022 08:03 AM    GFRAA >60 07/26/2022 08:03 AM    AGRATIO 0.9 12/12/2021 07:01 PM    LABGLOM >60 07/26/2022 08:03 AM    GLUCOSE 102 07/26/2022 08:03 AM    PROT 7.3 12/12/2021 07:01 PM    CALCIUM 8.3 07/26/2022 08:03 AM    BILITOT 0.4 12/12/2021 07:01 PM    ALKPHOS 79 12/12/2021 07:01

## 2022-09-14 NOTE — PROGRESS NOTES
TRANSFER - IN REPORT:    Verbal report received from Brii Yo RN on [facility]  being received from PACU for routine post-op      Report consisted of patient's Situation, Background, Assessment and   Recommendations(SBAR). Information from the following report(s) Nurse Handoff Report was reviewed with the receiving nurse. Opportunity for questions and clarification was provided. Assessment completed upon patient's arrival to unit and care assumed. Oriented to room and bed controls. Aquacel to R knee with iceman. SCDs and gripper socks to LES. Les remain numb. Family in room.

## 2022-09-14 NOTE — ANESTHESIA POSTPROCEDURE EVALUATION
Department of Anesthesiology  Postprocedure Note    Patient: Tariq Gilman  MRN: [id number]  YOB: 1945  Date of evaluation: 9/14/2022      Procedure Summary     Date: 09/14/22 Room / Location[place] [facility] 01 / [facility] [place]    Anesthesia Start: 0830 Anesthesia Stop: 4510    Procedure: rt KNEE TOTAL ARTHROPLASTY ROBOTIC/ SDD (Right: Knee) Diagnosis:       Arthritis of knee, right      (Arthritis of knee, right [M17.11])    Surgeons: Gustavo Cobos MD Responsible Provider: Tristin Ott MD    Anesthesia Type: Spinal ASA Status: 2          Anesthesia Type: Spinal    Simon Phase I: Simon Score: 7    Simon Phase II:        Anesthesia Post Evaluation    Patient location during evaluation: PACU  Patient participation: complete - patient participated  Level of consciousness: awake and alert  Airway patency: patent  Nausea & Vomiting: no nausea and no vomiting  Complications: no  Cardiovascular status: hemodynamically stable  Respiratory status: acceptable  Hydration status: euvolemic  Comments: Blood pressure 125/61, pulse 70, temperature 97.6 °F (36.4 °C), temperature source Temporal, resp. rate 17, weight 194 lb 1.6 oz (88 kg), SpO2 100 %.       Pt stable for discharge from PACU  Multimodal analgesia pain management approach

## 2022-09-14 NOTE — ANESTHESIA PROCEDURE NOTES
Spinal Block    Patient location during procedure: OR  End time: 9/14/2022 8:40 AM  Reason for block: primary anesthetic  Staffing  Performed: anesthesiologist   Anesthesiologist: Molly Harris MD  Spinal Block  Patient position: sitting  Prep: ChloraPrep and site prepped and draped  Patient monitoring: cardiac monitor, continuous pulse ox, frequent blood pressure checks and oxygen  Approach: midline  Location: L3/L4  Provider prep: mask and sterile gloves  Local infiltration: lidocaine  Needle  Needle type:  Fransico   Needle gauge: 25 G  Assessment  Swirl obtained: Yes  CSF: clear  Attempts: 1  Hemodynamics: stable  Preanesthetic Checklist  Completed: patient identified, IV checked, site marked, risks and benefits discussed, surgical/procedural consents, equipment checked, pre-op evaluation, timeout performed, anesthesia consent given, oxygen available and monitors applied/VS acknowledged

## 2022-09-14 NOTE — INTERVAL H&P NOTE
Update History & Physical    The patient's History and Physical of August 31, H&P was reviewed with the patient and I examined the patient. There was no change. The surgical site was confirmed by the patient and me. Plan: The risks, benefits, expected outcome, and alternative to the recommended procedure have been discussed with the patient. Patient understands and wants to proceed with the procedure.      Electronically signed by Suni Rivas MD on 9/14/2022 at 6:37 AM

## 2022-09-14 NOTE — CARE COORDINATION
Patient is a 78y.o. year old male admitted for Right TKA . Patient plans to return home on discharge. Order received to arrange home health. Patient without preference towards agency. Referral sent to [facility]. Patient requesting we arrange a walker. Referral sent to [address] [place], [place] [address]sam Str. delivered to the hospital room prior to discharge. Will follow until discharge. 09/14/22 6403   Service Assessment   Patient Orientation Alert and Oriented   Cognition Alert   History Provided By Patient   Primary Caregiver Self   Support Systems Spouse/Significant Other   Services At/After Discharge   Services At/After Discharge Home Health;PT   Condition of Participation: Discharge Planning   The Plan for Transition of Care is related to the following treatment goals: improve mobility   The Patient and/or Patient Representative was provided with a Choice of Provider? Patient   The Patient and/Or Patient Representative agree with the Discharge Plan?  Yes

## 2022-09-14 NOTE — OP NOTE
[address] [place] [address] Robotic Assisted Total Knee Arthroplasty: Posterior Cruciate Retaining       Patient:Jaguar Tom   : 1945  Medical Record [facility]:[id number]  Pre-operative Diagnosis:  Arthritis of knee, right [M17.11]  Post-operative Diagnosis: Arthritis of knee, right [M17.11]  Location: [address] [place] [address]  Surgeon: Rakesh Bauer MD   Assistant: Toan Campos    Anesthesia: Spinal and ACB    Indications: Patient has end stage arthritis. They have tried and failed conservative management. Procedure:Procedure(s) (LRB):  rt KNEE TOTAL ARTHROPLASTY ROBOTIC/ SDD (Right)            CPT- 94946- Total knee arthroplasty           95779- Other procedures on musculoskeletal system            0055T- Computer assisted surgical navigation   The complexity of the total joint surgery requires the use of a first assistant for positioning, retraction and expertise in closure. Tourniquet Time: 0 minutes  EBL: 250 cc  Findings: severe degenerative arthritis with loss of cartilage in weight bearing compartments of the knee,  patellar osteophytes with minimal loss of patella cartilage, posterior femoral osteophytes   BMI: Body mass index is 28.66 kg/m². Dwight Deluna was brought to the operating room and positioned on the operating table. He was anesthestized with anesthesia. IV antibiotics were administered. Prior to the incision being made a timeout was called identifying the patient, procedure ,operative side and surgeon The operative leg was prepped and draped in the usual sterile manner. An anterior longitudinal incision was accomplished just medial to the tibial tubercle and extending approximal 6 centimeters proximal to the superior pole of the patella. A medial parapatellar capsular incision was performed. The medial capsular flap was elevated around to the insertion of the semimembranous tendon. The patella was everted and the knee flexed and externally rotated.   The medial and external menisci were excised. The lateral half of the fat pad excised and the patella femoral ligament was released. The anterior cruciate ligament was resect and the posterior cruciate ligament was retained. The femoral and tibial arrays were pinned in place and registered with the [employer]. The patient landmarks were collected and the tibial and femoral checkpoints were registered and verified. The preresection balancing was performed. The distal femur was addressed first. Utilizing the [facility] robotic arm the distal femoral cut was made. The anterior and posterior cuts were then made. The osteophytes were removed from the tibial and femoral surfaces. The tibia was then addressed. The [facility] robotic arm was then used to make the measured resection of the tibia. The tibia was sized. The tibial base plate was pinned into place with the appropriate external rotation and stem site prepared. A trial femoral component and poly was placed. A preliminary range of motion was accomplished with the trial components. The patient was found to obtain full extension as well as appropriate flexion. The patient's ligaments were stable in flexion and extension to medial and lateral stressing and the alignment was through the appropriate mechanical axis. Additional surgical procedures included: none. The patella was then everted. The bone was resect to accommodate the three peg patellar button. A trial reduction of the patella revealed appropriate tracking through the patellofemoral groove with no lateral retinacular release being accomplished. All trial components were removed. The real implants were opened: Sizes listed below. The knee was irrigated. There were no femoral deficiencies. There were no tibial deficiencies. No augmentation was utilized. The tibial and femoral components were impacted into place. The patella component was cemented into place.     Gillespie Polite knee was placed through range of motion and noted to be stable as mentioned above with the trail components. The wound was dry, therefore no drain was used. The operative knee was injected with 60 cc of Naropin, 10 cc's of morphine and 1 cc of 30 mg of Toradol. The knee was then soaked with a diluted betadine solution for approximately 3 min. This was then thoroughly irrigated. The capsular layer was closed using a #1 Stratafix suture. Then, 1 gram (100 mg/ml) of Transexamic Acid was injected into the joint space. The subcutaneous layers were closed using 2-0 Stratafix. Finally the skin was closed using 3-0 Vicryl and staples which were applied in occlusive fashion and sterile bandage applied. An Iceman cryo pad was applied on the operative leg. Sponge count and needle counts were correct. Denzel Zieglerarita left the operating room     Implants:   Implant Name Type Inv.  Item Serial No.  Lot No. LRB No. Used Action   CEMENT BNE 20GM HALF DOSE PMMA W/ GENT M VISC RADPQ FAST - [id number]  CEMENT BNE 20GM HALF DOSE PMMA W/ GENT M VISC RADPQ FAST  [employer]- [id number] Right 1 Implanted   COMPONENT FEM SZ 6 R KNEE CRUCE RET CEMENTLESS BEAD W/ SERGIO - [id number]  COMPONENT FEM SZ 6 R KNEE CRUCE RET CEMENTLESS BEAD W/ SERGIO PSY6Y [employer] [facility] [id number] Right 1 Implanted   BASEPLATE TIB SZ 6 [id number] ML77MM KNEE TRITANIUM 4 CRUCFRM - [id number]  BASEPLATE TIB SZ 6 AT18WH ML77MM KNEE TRITANIUM 4 CRUCFRM [id number] [employer] [facility] [id number] Right 1 Implanted   [employer] Asymmetric Patella   [id number]  [id number] Right 1 Implanted   INSERT TIB SZ 6 THK9MM UNIV KNEE POLYETH CNDYL STBL CLOTILDE NEUT - [id number]  INSERT TIB SZ 6 THK9MM UNIV KNEE POLYETH CNDYL STBL CLOTILDE NEUT [id number] [employer] [facility] [id number] Right 1 Implanted         Signed By: Alida Camarena MD   9/14/2022,  9:58 AM

## 2022-09-22 DIAGNOSIS — Z96.651 HISTORY OF TOTAL KNEE ARTHROPLASTY, RIGHT: ICD-10-CM

## 2022-09-22 DIAGNOSIS — Z96.641 HISTORY OF TOTAL HIP ARTHROPLASTY, RIGHT: Primary | ICD-10-CM

## 2022-09-22 RX ORDER — OXYCODONE HYDROCHLORIDE 5 MG/1
5 TABLET ORAL EVERY 4 HOURS PRN
Qty: 30 TABLET | Refills: 0 | Status: SHIPPED | OUTPATIENT
Start: 2022-09-22 | End: 2022-09-27

## 2022-10-10 ENCOUNTER — TELEPHONE (OUTPATIENT)
Dept: ORTHOPEDIC SURGERY | Age: 77
End: 2022-10-10

## 2022-10-10 DIAGNOSIS — Z96.651 HISTORY OF TOTAL KNEE ARTHROPLASTY, RIGHT: Primary | ICD-10-CM

## 2022-10-10 NOTE — TELEPHONE ENCOUNTER
He finished therapy and is supposed to start outpatient at [facility] today and she is hoping that a rx has been sent over to them. They were supposed to call and request it last week.

## 2022-10-20 ENCOUNTER — OFFICE VISIT (OUTPATIENT)
Dept: ORTHOPEDIC SURGERY | Age: 77
End: 2022-10-20

## 2022-10-20 DIAGNOSIS — Z96.651 PRESENCE OF RIGHT ARTIFICIAL KNEE JOINT: Primary | ICD-10-CM

## 2022-10-20 PROCEDURE — 99024 POSTOP FOLLOW-UP VISIT: CPT | Performed by: ORTHOPAEDIC SURGERY

## 2022-10-20 NOTE — PROGRESS NOTES
Post-op TKA Visit      10/20/22     No Known Allergies  Current Outpatient Medications on File Prior to Visit   Medication Sig Dispense Refill    aspirin EC 81 MG EC tablet Take 1 tablet by mouth in the morning and 1 tablet in the evening. 70 tablet 0    promethazine (PHENERGAN) 25 MG tablet Take 1 tablet by mouth every 8 hours as needed for Nausea 30 tablet 1    methocarbamol (ROBAXIN-750) 750 MG tablet Take 1 tablet by mouth 3 times daily as needed (muscle spasm or cramps) 40 tablet 1    Cholecalciferol (VITAMIN D-3) 25 MCG (1000 UT) CAPS Take 1 tablet by mouth every morning      meloxicam (MOBIC) 15 MG tablet Take 15 mg by mouth as needed for Pain      [DISCONTINUED] Omega-3 Fatty Acids (FISH OIL) 1000 MG CAPS Take 1 tablet by mouth daily      [DISCONTINUED] vitamin A 3 MG (62651 UT) capsule Take 1 tablet by mouth every morning      [DISCONTINUED] Coenzyme Q10 (CO Q 10 PO) Take by mouth daily      [DISCONTINUED] ibuprofen (ADVIL;MOTRIN) 200 MG tablet Take 200 mg by mouth every 6 hours as needed for Pain      acetaminophen (TYLENOL) 500 MG tablet Take 1,000 mg by mouth every 6 hours as needed      calcium carbonate (OS-COCO) 1250 (500 Ca) MG chewable tablet Take 2 tablets by mouth as needed      simvastatin (ZOCOR) 20 MG tablet Take 20 mg by mouth nightly      tadalafil (CIALIS) 5 MG tablet Take 5 mg by mouth as needed      [DISCONTINUED] glucosamine-chondroitin 750-600 MG TABS tablet Take by mouth       No current facility-administered medications on file prior to visit. 5 week Status Post right TKA     History: The patient returns today for post-op visit following right TKA. Their pain is improving. They are ambulating with no walking aid. They have completed home physical therapy. Some difficulty sleeping. Physical Exam:  The patient's incision is well healed. There is moderate swelling and  warmth. ROM is 5 to 100 degrees. There is no M/L or A/P instability. The calf is soft and non-tender. Neurologic and vascular exams are intact. X-Rays: AP and Lateral x-rays of right reveals a cementless TKA, Louann prosthesis. There is a patella arthroplasty. There is good alignment and good position of the components. X-Ray Diagnosis: Satisfactory appearance right TKA    Disposition: The patient is doing well following right TKA. They will continue physical therapy exercises. The patient was advised about fall precautions and dental prophylaxis. The patient will follow up as scheduled or sooner if needed. Follow up in 6 weeks for ROM check.

## 2022-11-21 ENCOUNTER — OFFICE VISIT (OUTPATIENT)
Dept: ORTHOPEDIC SURGERY | Age: 77
End: 2022-11-21
Payer: MEDICARE

## 2022-11-21 DIAGNOSIS — M20.21 HALLUX RIGIDUS OF RIGHT FOOT: Primary | ICD-10-CM

## 2022-11-21 PROCEDURE — 1123F ACP DISCUSS/DSCN MKR DOCD: CPT | Performed by: ORTHOPAEDIC SURGERY

## 2022-11-21 PROCEDURE — G8427 DOCREV CUR MEDS BY ELIG CLIN: HCPCS | Performed by: ORTHOPAEDIC SURGERY

## 2022-11-21 PROCEDURE — G8484 FLU IMMUNIZE NO ADMIN: HCPCS | Performed by: ORTHOPAEDIC SURGERY

## 2022-11-21 PROCEDURE — 1036F TOBACCO NON-USER: CPT | Performed by: ORTHOPAEDIC SURGERY

## 2022-11-21 PROCEDURE — G8417 CALC BMI ABV UP PARAM F/U: HCPCS | Performed by: ORTHOPAEDIC SURGERY

## 2022-11-21 PROCEDURE — 99213 OFFICE O/P EST LOW 20 MIN: CPT | Performed by: ORTHOPAEDIC SURGERY

## 2022-11-21 PROCEDURE — 3017F COLORECTAL CA SCREEN DOC REV: CPT | Performed by: ORTHOPAEDIC SURGERY

## 2022-11-21 NOTE — PROGRESS NOTES
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 male who presents with Foot Pain (Right First Metatarsophalangeal Cheilectomy  Date of Procedure: 2/10/2022 - states he is finished with PT still has stiffness)  . This patient presents back to the office today for long-term follow-up after right hip first MTP cheilectomy done back in February. Is also complaints of stiffness in the joint. Of note he had a total knee arthroplasty as well as only got about 110 degrees of flexion on that side. History was obtained by Patient     ROS/Meds/PSH/PMH/FH/SH: I personally reviewed the patients standard intake form. Below are the pertinents    Tobacco:  reports that he has never smoked. He has been exposed to tobacco smoke. He has never used smokeless tobacco.  Diabetes: None      Physical Examination:  Exam of the right foot shows well-healed surgical incision. He has about 10 degrees of dorsiflexion and 10 degrees of plantarflexion. There is no pain throughout the arc of motion. 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  I told him the really only treatment for this would be a fusion which is exact opposite of what he is asking for today which is to increase his range of motion. He will return in 6 months.

## 2022-12-01 ENCOUNTER — OFFICE VISIT (OUTPATIENT)
Dept: ORTHOPEDIC SURGERY | Age: 77
End: 2022-12-01

## 2022-12-01 DIAGNOSIS — Z96.651 PRESENCE OF RIGHT ARTIFICIAL KNEE JOINT: Primary | ICD-10-CM

## 2022-12-01 NOTE — PROGRESS NOTES
12/01/22        Name: Cristino Briseno  YOB: 1945  Gender: male  MRN: [id number]    CC: Follow-up (Saima tka- rom)       HPI: Cristino Briseno is a 78 y.o. male who returns for Follow-up (Saima tka- rom)     Patient returns the office today for range of motion check. He feels his motion is improving. He is satisfied with his motion and function of his knee. His knee pain is well controlled. He continues to have some persistent pain and has right great toe even after having surgery with Dr. Russell Ruggiero earlier this year for it. He has seen Dr. Russell Ruggiero in follow-up regarding it but he is beginning to have some pain extending from his knee down his shin around his ankle area and he is unsure the cause. This pain is intermittent and comes and goes with activity. He does not have a good idea of triggers or causes for it. History was obtained by patient    ROS/Meds/PSH/PMH/FH/SH: I personally reviewed the patients standard intake form. Below are the pertinents      Physical Examination:  Right knee incision is well-healed there is a keloid formation at the proximal third of the incision. Motion of the right knee is 3 to 110 degrees. Calf is soft nontender. Mild distal edema of the lower extremity. He ambulates without appreciable limp. Imaging:   None        Follow up: The patient is satisfied with his progress. He continues to make progress with extension and flexion. Agreed to forego manipulation at this point and continue with exercises. The patient has scheduled left total knee replacement after the first of the year. We will plan to see the patient in follow-up after his neck surgery and we can reevaluate his right knee at that time. This pain is having an issue and I am unsure whether it is due to his recovery from his knee replacement or a secondary effect from his foot surgery.   He does admit to sometimes offloading or altering his gait depending on how long he is walking or having to stand on it.

## 2022-12-27 DIAGNOSIS — M17.12 PRIMARY OSTEOARTHRITIS OF LEFT KNEE: Primary | ICD-10-CM

## 2023-01-23 DIAGNOSIS — M17.12 PRIMARY OSTEOARTHRITIS OF LEFT KNEE: Primary | ICD-10-CM

## 2023-01-30 RX ORDER — ACETAMINOPHEN 500 MG
1000 TABLET ORAL ONCE
OUTPATIENT
Start: 2023-01-30 | End: 2023-01-30

## 2023-01-30 RX ORDER — SODIUM CHLORIDE 0.9 % (FLUSH) 0.9 %
5-40 SYRINGE (ML) INJECTION EVERY 12 HOURS SCHEDULED
OUTPATIENT
Start: 2023-01-30

## 2023-01-30 RX ORDER — SODIUM CHLORIDE 9 MG/ML
INJECTION, SOLUTION INTRAVENOUS PRN
OUTPATIENT
Start: 2023-01-30

## 2023-01-30 RX ORDER — SODIUM CHLORIDE 0.9 % (FLUSH) 0.9 %
5-40 SYRINGE (ML) INJECTION PRN
OUTPATIENT
Start: 2023-01-30

## 2023-02-03 ENCOUNTER — HOSPITAL ENCOUNTER (OUTPATIENT)
Dept: SURGERY | Age: 78
End: 2023-02-03
Payer: MEDICARE

## 2023-02-03 VITALS
HEIGHT: 69 IN | DIASTOLIC BLOOD PRESSURE: 75 MMHG | HEART RATE: 65 BPM | RESPIRATION RATE: 18 BRPM | TEMPERATURE: 97.5 F | OXYGEN SATURATION: 99 % | SYSTOLIC BLOOD PRESSURE: 134 MMHG | BODY MASS INDEX: 27.25 KG/M2 | WEIGHT: 184 LBS

## 2023-02-03 DIAGNOSIS — M17.12 PRIMARY OSTEOARTHRITIS OF LEFT KNEE: ICD-10-CM

## 2023-02-03 DIAGNOSIS — M17.12 PRIMARY OSTEOARTHRITIS OF LEFT KNEE: Primary | ICD-10-CM

## 2023-02-03 LAB
ANION GAP SERPL CALC-SCNC: 5 MMOL/L (ref 2–11)
APTT PPP: 31.1 SEC (ref 24.5–34.2)
BACTERIA SPEC CULT: NORMAL
BASOPHILS # BLD: 0.1 K/UL (ref 0–0.2)
BASOPHILS NFR BLD: 1 % (ref 0–2)
BUN SERPL-MCNC: 17 MG/DL (ref 8–23)
CALCIUM SERPL-MCNC: 9.2 MG/DL (ref 8.3–10.4)
CHLORIDE SERPL-SCNC: 103 MMOL/L (ref 101–110)
CO2 SERPL-SCNC: 30 MMOL/L (ref 21–32)
CREAT SERPL-MCNC: 0.84 MG/DL (ref 0.8–1.5)
DIFFERENTIAL METHOD BLD: ABNORMAL
EOSINOPHIL # BLD: 0.2 K/UL (ref 0–0.8)
EOSINOPHIL NFR BLD: 3 % (ref 0.5–7.8)
ERYTHROCYTE [DISTWIDTH] IN BLOOD BY AUTOMATED COUNT: 13.3 % (ref 11.9–14.6)
EST. AVERAGE GLUCOSE BLD GHB EST-MCNC: 105 MG/DL
GLUCOSE SERPL-MCNC: 85 MG/DL (ref 65–100)
HBA1C MFR BLD: 5.3 % (ref 4.8–5.6)
HCT VFR BLD AUTO: 42.5 % (ref 41.1–50.3)
HGB BLD-MCNC: 13.9 G/DL (ref 13.6–17.2)
IMM GRANULOCYTES # BLD AUTO: 0 K/UL (ref 0–0.5)
IMM GRANULOCYTES NFR BLD AUTO: 0 % (ref 0–5)
INR PPP: 1
LYMPHOCYTES # BLD: 1 K/UL (ref 0.5–4.6)
LYMPHOCYTES NFR BLD: 21 % (ref 13–44)
MCH RBC QN AUTO: 31.5 PG (ref 26.1–32.9)
MCHC RBC AUTO-ENTMCNC: 32.7 G/DL (ref 31.4–35)
MCV RBC AUTO: 96.4 FL (ref 82–102)
MONOCYTES # BLD: 0.6 K/UL (ref 0.1–1.3)
MONOCYTES NFR BLD: 12 % (ref 4–12)
NEUTS SEG # BLD: 3.1 K/UL (ref 1.7–8.2)
NEUTS SEG NFR BLD: 63 % (ref 43–78)
NRBC # BLD: 0 K/UL (ref 0–0.2)
PLATELET # BLD AUTO: 235 K/UL (ref 150–450)
PMV BLD AUTO: 9.2 FL (ref 9.4–12.3)
POTASSIUM SERPL-SCNC: 4.4 MMOL/L (ref 3.5–5.1)
PROTHROMBIN TIME: 13.5 SEC (ref 12.6–14.3)
RBC # BLD AUTO: 4.41 M/UL (ref 4.23–5.6)
SERVICE CMNT-IMP: NORMAL
SODIUM SERPL-SCNC: 138 MMOL/L (ref 133–143)
WBC # BLD AUTO: 4.9 K/UL (ref 4.3–11.1)

## 2023-02-03 PROCEDURE — 80048 BASIC METABOLIC PNL TOTAL CA: CPT

## 2023-02-03 PROCEDURE — 83036 HEMOGLOBIN GLYCOSYLATED A1C: CPT

## 2023-02-03 PROCEDURE — 87641 MR-STAPH DNA AMP PROBE: CPT

## 2023-02-03 PROCEDURE — 85610 PROTHROMBIN TIME: CPT

## 2023-02-03 PROCEDURE — 85025 COMPLETE CBC W/AUTO DIFF WBC: CPT

## 2023-02-03 PROCEDURE — 85730 THROMBOPLASTIN TIME PARTIAL: CPT

## 2023-02-03 RX ORDER — CALCIUM CARBONATE 200(500)MG
2 TABLET,CHEWABLE ORAL PRN
COMMUNITY

## 2023-02-03 RX ORDER — LISINOPRIL 5 MG/1
5 TABLET ORAL NIGHTLY
COMMUNITY
Start: 2022-12-15

## 2023-02-03 ASSESSMENT — PAIN DESCRIPTION - DESCRIPTORS: DESCRIPTORS: ACHING

## 2023-02-03 ASSESSMENT — PAIN SCALES - GENERAL: PAINLEVEL_OUTOF10: 2

## 2023-02-03 ASSESSMENT — PAIN DESCRIPTION - LOCATION: LOCATION: KNEE

## 2023-02-03 ASSESSMENT — PAIN DESCRIPTION - ORIENTATION: ORIENTATION: LEFT

## 2023-02-03 NOTE — PERIOP NOTE
How to Use Your Incentive Spirometer (10 breaths twice a day starting at least a week piror to surgery)       About Your Incentive Spirometer  An incentive spirometer is a device that will expand your lungs by helping you to breathe more deeply and fully. The parts of your incentive spirometer are labeled in Figure 1. Using your incentive spirometer  When you're using your incentive spirometer, make sure to breathe through your mouth. If you breathe through your nose, the incentive spirometer won't work properly. You can hold your nose if you have trouble. DO NOT BLOW INTO THE DEVICE. If you feel dizzy at any time, stop and rest. Try again at a later time. Sit upright in a chair or in bed. Hold the incentive spirometer at eye level. Put the mouthpiece in your mouth and close your lips tightly around it. Slowly breathe out (exhale) completely. Breathe in (inhale) slowly through your mouth as deeply as you can. As you take the breath, you will see the piston rise inside the large column. While the piston rises, the indicator on the right should move upwards. It should stay in between the 2 arrows (see Figure 1). Try to get the piston as high as you can, while keeping the indicator between the arrows. If the indicator doesn't stay between the arrows, you're breathing either too fast or too slow. When you get it as high as you can, hold your breath for 10 seconds, or as long as possible. While you're holding your breath, the piston will slowly fall to the base of the spirometer. Once the piston reaches the bottom of the spirometer, breathe out slowly through your mouth. Rest for a few seconds. Repeat 10 times. Try to get the piston to the same level with each breath. After each set of 10 breaths, try to cough as coughing will help loosen or clear any mucus in your lungs. Put the marker at the level the piston reached on your incentive spirometer. This will be your goal next time.   Repeat these steps every hour that you're awake.   Cover the mouthpiece of the incentive spirometer when you aren't using it

## 2023-02-03 NOTE — PERIOP NOTE
Patient verified name and . Order for consent found in EHR and matches case posting; patient verified. Type 3 surgery, walk in assessment complete. Labs per surgeon: CBC, BMP, PT/PTT, Hgb-A1c ; results pending. Labs per anesthesia protocol: no additional labs needed. EKG: last completed on 22 at prior joint camp appointment; result was reviewed by Dr. Rachele Jacobs (anesthesia) on 22 and was okayed to proceed at that time. Patient denied chest pain and sob when asked today. MRSA/MSSA swab collected; pharmacy to review and dose antibiotic as appropriate. Hospital approved surgical skin cleanser and instructions to return bottle on DOS given per hospital policy. Patient provided with handouts including Guide to Surgery, Pain Management, Hand Hygiene, Blood Transfusion Education, and [place] [facility] Brochure. Patient answered medical/surgical history questions at their best of ability. All prior to admission medications documented in [facility]. Original medication prescription bottles NOT visualized during patient appointment. Patient instructed to hold all vitamins 3 weeks prior to surgery and NSAIDS 5 days prior to surgery. Patient teach back successful and patient demonstrates knowledge of instruction.

## 2023-02-03 NOTE — PERIOP NOTE
PLEASE CONTINUE TAKING ALL PRESCRIPTION MEDICATIONS UP TO THE DAY OF SURGERY UNLESS OTHERWISE DIRECTED BELOW. DISCONTINUE all vitamins, herbals, and supplements 21 days prior to surgery. DISCONTINUE Non-Steriodal Anti-Inflammatory (NSAIDS) such as Advil, Ibuprofen, Motrin, Naproxen, and Aleve 5-7 days prior to surgery. Home Medications to take  the day of surgery (2/17/23)      NONE           Home Medications   to Hold     Cialis-hold for 5 days prior      Meloxicam-hold for 5 days prior      Comments      On the day before surgery (2/16/23)  please take Acetaminophen 1000mg in the morning and then again before bed. You may substitute for Tylenol 650 mg.    Bring: Photo ID, Insurance card, Dynahex wash, and Incentive Spirometer with you to hospital on the day of surgery. Please do not bring home medications with you on the day of surgery unless otherwise directed by your nurse. If you are instructed to bring home medications, please give them to your nurse as they will be administered by the nursing staff. If you have any questions, please call John Jackson [phone]. A copy of this note was provided to the patient for reference.

## 2023-02-06 NOTE — PROGRESS NOTES
Total Joint Surgery Preoperative Chart Review      Patient ID:  Jus Aguero  [id number]  14 y.o.  1945  Surgeon: Dr. Mat Alva  Date of Surgery: 2/17/2023  Procedure: Total Left Knee Arthroplasty  Primary Care Physician: Rafia Gonzales [place] [phone]  Specialty Physician(s):      Subjective:   Jus Aguero is a 78 y.o. White (non-) male who presents for preoperative evaluation for Total Left Knee arthroplasty. This is a preoperative chart review note based on data collected by the nurse at the surgical Pre-Assessment visit. Past Medical History:   Diagnosis Date    Arthritis     OA    COVID-19 07/16/2022    mild cough    Erectile dysfunction     cialis prn    GERD (gastroesophageal reflux disease)     seldom, TUMS prn- well controlled    Hypertension     on med for control    Inguinal hernia     right    Mixed hyperlipidemia     on med for control    Palpitations     Personal history of malignant neoplasm of prostate     Prostate cancer ([facility] [address] 03/2008    radiation 2007, prostatectomy 2008    Skin cancer, basal cell     skin basal cell, shoulder and back      Past Surgical History:   Procedure Laterality Date    COLONOSCOPY      HEENT  01/2022    implant/dental    HERNIA REPAIR  6/4/2013    ORTHOPEDIC SURGERY Right 02/10/2022    RIGHT FIRST METATARSOPHALANGEAL CHEILECTOMY    PROSTATECTOMY  2008    followed by radiation in 2012    TOTAL KNEE ARTHROPLASTY Right 09/14/2022    rt KNEE TOTAL ARTHROPLASTY ROBOTIC/ SDD performed by Xuan Olivares MD at [facility]     Family History   Problem Relation Age of Onset    Stroke Mother     Heart Disease Father       Social History     Tobacco Use    Smoking status: Never     Passive exposure: Past (parents and work environment)    Smokeless tobacco: Never   Substance Use Topics    Alcohol use: Yes     Comment: occasionally       Prior to Admission medications    Medication Sig Start Date End Date Taking?  Authorizing Provider   calcium carbonate (TUMS) 500 MG chewable tablet Take 2 tablets by mouth as needed for Heartburn   Yes Historical Provider, MD   Coenzyme Q10 (COQ10 PO) Take by mouth   Yes Historical Provider, MD   Omega-3 Fatty Acids (FISH OIL PO) Take by mouth Twice a Week   Yes Historical Provider, MD   VITAMIN A PO Take by mouth daily   Yes Historical Provider, MD   VITAMIN K PO Take by mouth daily   Yes Historical Provider, MD   lisinopril (PRINIVIL;ZESTRIL) 5 MG tablet Take 5 mg by mouth at bedtime 12/15/22   Historical Provider, MD   GLUCOSAMINE-CHONDROITIN PO Take 1 tablet by mouth daily    Historical Provider, MD   aspirin EC 81 MG EC tablet Take 1 tablet by mouth in the morning and 1 tablet in the evening. 9/14/22 10/19/22  CONCHITA Jackson Ma   promethazine (PHENERGAN) 25 MG tablet Take 1 tablet by mouth every 8 hours as needed for Nausea 9/14/22   CONCHITA Jackson Ma   methocarbamol (ROBAXIN-750) 750 MG tablet Take 1 tablet by mouth 3 times daily as needed (muscle spasm or cramps) 9/14/22   CONCHITA Jackson Ma   Cholecalciferol (VITAMIN D-3) 25 MCG (1000 UT) CAPS Take 1 tablet by mouth every morning    Historical Provider, MD   meloxicam (MOBIC) 15 MG tablet Take 15 mg by mouth as needed for Pain    Historical Provider, MD   ibuprofen (ADVIL;MOTRIN) 200 MG tablet Take 200 mg by mouth every 6 hours as needed for Pain  9/14/22  Historical Provider, MD   acetaminophen (TYLENOL) 500 MG tablet Take 1,000 mg by mouth every 6 hours as needed for Pain    Ar Automatic Reconciliation   simvastatin (ZOCOR) 20 MG tablet Take 20 mg by mouth nightly 12/21/15   Ar Automatic Reconciliation   tadalafil (CIALIS) 5 MG tablet Take 5 mg by mouth as needed for Erectile Dysfunction 12/21/15   Ar Automatic Reconciliation   glucosamine-chondroitin 750-600 MG TABS tablet Take by mouth  9/14/22  Ar Automatic Reconciliation     No Known Allergies       Objective:     Physical Exam:   No data found.     ECG:    No results found for this or any previous visit (from the past 4464 hour(s)). Data Review:   Labs:   Labs reviewed    Problem List:  )  Patient Active Problem List   Diagnosis    ED (erectile dysfunction)    Skin cancer, basal cell    Arthritis    Prediabetes    Onychomycosis    Blepharoptosis    Eyelid twitch    Neck pain    GERD (gastroesophageal reflux disease)    Prostate cancer (HCC)    Mixed hyperlipidemia    Arthritis of knee, right    Snoring    Unilateral primary osteoarthritis, right knee    Primary osteoarthritis of left knee       Total Joint Surgery Pre-Assessment Recommendations:           Continuous saturation monitoring during hospitalization. O2 prn per respiratory protocol.      Signed By: NYA Cartagena - CNP-C    February 6, 2023

## 2023-02-09 ENCOUNTER — OFFICE VISIT (OUTPATIENT)
Dept: ORTHOPEDIC SURGERY | Age: 78
End: 2023-02-09

## 2023-02-09 DIAGNOSIS — M17.12 UNILATERAL PRIMARY OSTEOARTHRITIS, LEFT KNEE: Primary | ICD-10-CM

## 2023-02-09 NOTE — PROGRESS NOTES
[id number] [facility]  Pre Operative History and Physical Exam    Patient ID:  Leesa Nguyen  [id number]  67 y.o.  1945    Today: February 9, 2023           CC: Left knee pain    HPI:   The patient has end stage arthritis of the left knee. The patient was evaluated and examined during a consultation prior to this office visit. There have been no changes to the patient's orthopedic condition since the initial consultation. The patient has failed previous conservative treatment for this condition including antiinflammatories , and lifestyle modifications. The necessity for joint replacement is present.  The patient will be admitted the day of surgery for left knee replacement    Past Medical/Surgical History:  Past Medical History:   Diagnosis Date    Arthritis     OA    COVID-19 07/16/2022    mild cough    Erectile dysfunction     cialis prn    GERD (gastroesophageal reflux disease)     seldom, TUMS prn- well controlled    Hypertension     on med for control    Inguinal hernia     right    Mixed hyperlipidemia     on med for control    Palpitations     Personal history of malignant neoplasm of prostate     Prostate cancer ([facility] [address] 03/2008    radiation 2007, prostatectomy 2008    Skin cancer, basal cell     skin basal cell, shoulder and back     Past Surgical History:   Procedure Laterality Date    COLONOSCOPY      HEENT  01/2022    implant/dental    HERNIA REPAIR  6/4/2013    ORTHOPEDIC SURGERY Right 02/10/2022    RIGHT FIRST METATARSOPHALANGEAL CHEILECTOMY    PROSTATECTOMY  2008    followed by radiation in 2012    TOTAL KNEE ARTHROPLASTY Right 09/14/2022    rt KNEE TOTAL ARTHROPLASTY ROBOTIC/ SDD performed by Airam Mathur MD at [facility]        Allergies: No Known Allergies     Physical Exam:   General: NAD, Alert, Oriented, Appears their stated age     [de-identified]: NC/AT    Skin: No rashes, lesions or wounds seen      Psych: normal affect      Heart: Regular Rate, Rhythm     Lungs: unlabored respirations, no wheezing    Abdomen: Soft and non-distended     Ortho: Pain with limited ROM of the left knee    Neuro: no focal defects, moving extremities equally    Lymph: no lymphadenopathy     Meds:   Current Outpatient Medications   Medication Sig    calcium carbonate (TUMS) 500 MG chewable tablet Take 2 tablets by mouth as needed for Heartburn    lisinopril (PRINIVIL;ZESTRIL) 5 MG tablet Take 5 mg by mouth at bedtime    GLUCOSAMINE-CHONDROITIN PO Take 1 tablet by mouth daily    Coenzyme Q10 (COQ10 PO) Take by mouth    Omega-3 Fatty Acids (FISH OIL PO) Take by mouth Twice a Week    VITAMIN A PO Take by mouth daily    VITAMIN K PO Take by mouth daily    aspirin EC 81 MG EC tablet Take 1 tablet by mouth in the morning and 1 tablet in the evening. promethazine (PHENERGAN) 25 MG tablet Take 1 tablet by mouth every 8 hours as needed for Nausea    methocarbamol (ROBAXIN-750) 750 MG tablet Take 1 tablet by mouth 3 times daily as needed (muscle spasm or cramps)    Cholecalciferol (VITAMIN D-3) 25 MCG (1000 UT) CAPS Take 1 tablet by mouth every morning    meloxicam (MOBIC) 15 MG tablet Take 15 mg by mouth as needed for Pain    acetaminophen (TYLENOL) 500 MG tablet Take 1,000 mg by mouth every 6 hours as needed for Pain    simvastatin (ZOCOR) 20 MG tablet Take 20 mg by mouth nightly    tadalafil (CIALIS) 5 MG tablet Take 5 mg by mouth as needed for Erectile Dysfunction     No current facility-administered medications for this visit. Labs:  Hospital Outpatient Visit on 02/03/2023   Component Date Value Ref Range Status    Special Requests 02/03/2023 NO SPECIAL REQUESTS    Final    Culture 02/03/2023 SA target not detected. A MRSA NEGATIVE, SA NEGATIVE test result does not preclude MRSA or SA nasal colonization.     Final    Protime 02/03/2023 13.5  12.6 - 14.3 sec Final    INR 02/03/2023 1.0    Final    Comment: Suggested therapeutic INR range:  Venous thrombosis and embolus 2.0-3.0  Prosthetic heart valve         2.5-3.5  ** Note new reference range and method **      Hemoglobin A1C 02/03/2023 5.3  4.8 - 5.6 % Final    eAG 02/03/2023 105  mg/dL Final    Comment: Reference Range  Normal: 4.8-5.6  Diabetic >=6.5%  Normal       <5.7%      WBC 02/03/2023 4.9  4.3 - 11.1 K/uL Final    RBC 02/03/2023 4.41  4.23 - 5.6 M/uL Final    Hemoglobin 02/03/2023 13.9  13.6 - 17.2 g/dL Final    Hematocrit 02/03/2023 42.5  41.1 - 50.3 % Final    MCV 02/03/2023 96.4  82.0 - 102.0 FL Final    MCH 02/03/2023 31.5  26.1 - 32.9 PG Final    MCHC 02/03/2023 32.7  31.4 - 35.0 g/dL Final    RDW 02/03/2023 13.3  11.9 - 14.6 % Final    Platelets 75/07/3451 235  150 - 450 K/uL Final    MPV 02/03/2023 9.2 (A)  9.4 - 12.3 FL Final    nRBC 02/03/2023 0.00  0.0 - 0.2 K/uL Final    **Note: Absolute NRBC parameter is now reported with Hemogram**    Differential Type 02/03/2023 AUTOMATED    Final    Seg Neutrophils 02/03/2023 63  43 - 78 % Final    Lymphocytes 02/03/2023 21  13 - 44 % Final    Monocytes 02/03/2023 12  4.0 - 12.0 % Final    Eosinophils % 02/03/2023 3  0.5 - 7.8 % Final    Basophils 02/03/2023 1  0.0 - 2.0 % Final    Immature Granulocytes 02/03/2023 0  0.0 - 5.0 % Final    Segs Absolute 02/03/2023 3.1  1.7 - 8.2 K/UL Final    Absolute Lymph # 02/03/2023 1.0  0.5 - 4.6 K/UL Final    Absolute Mono # 02/03/2023 0.6  0.1 - 1.3 K/UL Final    Absolute Eos # 02/03/2023 0.2  0.0 - 0.8 K/UL Final    Basophils Absolute 02/03/2023 0.1  0.0 - 0.2 K/UL Final    Absolute Immature Granulocyte 02/03/2023 0.0  0.0 - 0.5 K/UL Final    Sodium 02/03/2023 138  133 - 143 mmol/L Final    Potassium 02/03/2023 4.4  3.5 - 5.1 mmol/L Final    Chloride 02/03/2023 103  101 - 110 mmol/L Final    CO2 02/03/2023 30  21 - 32 mmol/L Final    Anion Gap 02/03/2023 5  2 - 11 mmol/L Final    Glucose 02/03/2023 85  65 - 100 mg/dL Final    BUN 02/03/2023 17  8 - 23 MG/DL Final    Creatinine 02/03/2023 0.84  0.8 - 1.5 MG/DL Final    Est, Glom Filt Rate 02/03/2023 >60  >60 ml/min/1.73m2 Final    Comment:      Pediatric calculator link: Doreen.at. org/professionals/kdoqi/gfr_calculatorped       These results are not intended for use in patients <25years of age. eGFR results are calculated without a race factor using  the 2021 CKD-EPI equation. Careful clinical correlation is recommended, particularly when comparing to results calculated using previous equations. The CKD-EPI equation is less accurate in patients with extremes of muscle mass, extra-renal metabolism of creatinine, excessive creatine ingestion, or following therapy that affects renal tubular secretion. Calcium 02/03/2023 9.2  8.3 - 10.4 MG/DL Final    PTT 02/03/2023 31.1  24.5 - 34.2 SEC Final    Comment: Heparin Therapeutic Range = 74 - 123 seconds  In addition to factor deficiency, monitoring heparin therapy, etc., evaluation of a prolonged aPTT result should include consideration of preanalytic variables such as heparin flush contamination, specimen integrity issues, etc.                   Patient Active Problem List   Diagnosis    ED (erectile dysfunction)    Skin cancer, basal cell    Arthritis    Prediabetes    Onychomycosis    Blepharoptosis    Eyelid twitch    Neck pain    GERD (gastroesophageal reflux disease)    Prostate cancer (HCC)    Mixed hyperlipidemia    Arthritis of knee, right    Snoring    Unilateral primary osteoarthritis, right knee    Primary osteoarthritis of left knee     XRAY:  AP, lateral, sunrise, and 45 degree  views of the left knee are reviewed. There is joint space loss, eburnated bone, and osteophyte formation present. X-ray impression:  Advanced degenerative joint disease of left knee    Assessment:   1. Arthritis of the left knee      Plan:    1. Proceed with scheduled left knee replacement       All material risks, benefits, and reasonable alternatives including postponing the procedure were discussed.  The patient does wish to proceed with the procedure at this time.          Signed By: CONCHITA Pickett  February 9, 2023

## 2023-02-09 NOTE — H&P (VIEW-ONLY)
[id number] [facility]  Pre Operative History and Physical Exam    Patient ID:  Ronald Oviedo  [id number]  26 y.o.  1945    Today: February 9, 2023           CC: Left knee pain    HPI:   The patient has end stage arthritis of the left knee. The patient was evaluated and examined during a consultation prior to this office visit. There have been no changes to the patient's orthopedic condition since the initial consultation. The patient has failed previous conservative treatment for this condition including antiinflammatories , and lifestyle modifications. The necessity for joint replacement is present.  The patient will be admitted the day of surgery for left knee replacement    Past Medical/Surgical History:  Past Medical History:   Diagnosis Date    Arthritis     OA    COVID-19 07/16/2022    mild cough    Erectile dysfunction     cialis prn    GERD (gastroesophageal reflux disease)     seldom, TUMS prn- well controlled    Hypertension     on med for control    Inguinal hernia     right    Mixed hyperlipidemia     on med for control    Palpitations     Personal history of malignant neoplasm of prostate     Prostate cancer ([facility] [address] 03/2008    radiation 2007, prostatectomy 2008    Skin cancer, basal cell     skin basal cell, shoulder and back     Past Surgical History:   Procedure Laterality Date    COLONOSCOPY      HEENT  01/2022    implant/dental    HERNIA REPAIR  6/4/2013    ORTHOPEDIC SURGERY Right 02/10/2022    RIGHT FIRST METATARSOPHALANGEAL CHEILECTOMY    PROSTATECTOMY  2008    followed by radiation in 2012    TOTAL KNEE ARTHROPLASTY Right 09/14/2022    rt KNEE TOTAL ARTHROPLASTY ROBOTIC/ SDD performed by Jenaro Moeller MD at [facility]        Allergies: No Known Allergies     Physical Exam:   General: NAD, Alert, Oriented, Appears their stated age     [de-identified]: NC/AT    Skin: No rashes, lesions or wounds seen      Psych: normal affect      Heart: Regular Rate, Rhythm     Lungs: unlabored respirations, no wheezing    Abdomen: Soft and non-distended     Ortho: Pain with limited ROM of the left knee    Neuro: no focal defects, moving extremities equally    Lymph: no lymphadenopathy     Meds:   Current Outpatient Medications   Medication Sig    calcium carbonate (TUMS) 500 MG chewable tablet Take 2 tablets by mouth as needed for Heartburn    lisinopril (PRINIVIL;ZESTRIL) 5 MG tablet Take 5 mg by mouth at bedtime    GLUCOSAMINE-CHONDROITIN PO Take 1 tablet by mouth daily    Coenzyme Q10 (COQ10 PO) Take by mouth    Omega-3 Fatty Acids (FISH OIL PO) Take by mouth Twice a Week    VITAMIN A PO Take by mouth daily    VITAMIN K PO Take by mouth daily    aspirin EC 81 MG EC tablet Take 1 tablet by mouth in the morning and 1 tablet in the evening. promethazine (PHENERGAN) 25 MG tablet Take 1 tablet by mouth every 8 hours as needed for Nausea    methocarbamol (ROBAXIN-750) 750 MG tablet Take 1 tablet by mouth 3 times daily as needed (muscle spasm or cramps)    Cholecalciferol (VITAMIN D-3) 25 MCG (1000 UT) CAPS Take 1 tablet by mouth every morning    meloxicam (MOBIC) 15 MG tablet Take 15 mg by mouth as needed for Pain    acetaminophen (TYLENOL) 500 MG tablet Take 1,000 mg by mouth every 6 hours as needed for Pain    simvastatin (ZOCOR) 20 MG tablet Take 20 mg by mouth nightly    tadalafil (CIALIS) 5 MG tablet Take 5 mg by mouth as needed for Erectile Dysfunction     No current facility-administered medications for this visit. Labs:  Hospital Outpatient Visit on 02/03/2023   Component Date Value Ref Range Status    Special Requests 02/03/2023 NO SPECIAL REQUESTS    Final    Culture 02/03/2023 SA target not detected. A MRSA NEGATIVE, SA NEGATIVE test result does not preclude MRSA or SA nasal colonization.     Final    Protime 02/03/2023 13.5  12.6 - 14.3 sec Final    INR 02/03/2023 1.0    Final    Comment: Suggested therapeutic INR range:  Venous thrombosis and embolus 2.0-3.0  Prosthetic heart valve         2.5-3.5  ** Note new reference range and method **      Hemoglobin A1C 02/03/2023 5.3  4.8 - 5.6 % Final    eAG 02/03/2023 105  mg/dL Final    Comment: Reference Range  Normal: 4.8-5.6  Diabetic >=6.5%  Normal       <5.7%      WBC 02/03/2023 4.9  4.3 - 11.1 K/uL Final    RBC 02/03/2023 4.41  4.23 - 5.6 M/uL Final    Hemoglobin 02/03/2023 13.9  13.6 - 17.2 g/dL Final    Hematocrit 02/03/2023 42.5  41.1 - 50.3 % Final    MCV 02/03/2023 96.4  82.0 - 102.0 FL Final    MCH 02/03/2023 31.5  26.1 - 32.9 PG Final    MCHC 02/03/2023 32.7  31.4 - 35.0 g/dL Final    RDW 02/03/2023 13.3  11.9 - 14.6 % Final    Platelets 03/34/2200 235  150 - 450 K/uL Final    MPV 02/03/2023 9.2 (A)  9.4 - 12.3 FL Final    nRBC 02/03/2023 0.00  0.0 - 0.2 K/uL Final    **Note: Absolute NRBC parameter is now reported with Hemogram**    Differential Type 02/03/2023 AUTOMATED    Final    Seg Neutrophils 02/03/2023 63  43 - 78 % Final    Lymphocytes 02/03/2023 21  13 - 44 % Final    Monocytes 02/03/2023 12  4.0 - 12.0 % Final    Eosinophils % 02/03/2023 3  0.5 - 7.8 % Final    Basophils 02/03/2023 1  0.0 - 2.0 % Final    Immature Granulocytes 02/03/2023 0  0.0 - 5.0 % Final    Segs Absolute 02/03/2023 3.1  1.7 - 8.2 K/UL Final    Absolute Lymph # 02/03/2023 1.0  0.5 - 4.6 K/UL Final    Absolute Mono # 02/03/2023 0.6  0.1 - 1.3 K/UL Final    Absolute Eos # 02/03/2023 0.2  0.0 - 0.8 K/UL Final    Basophils Absolute 02/03/2023 0.1  0.0 - 0.2 K/UL Final    Absolute Immature Granulocyte 02/03/2023 0.0  0.0 - 0.5 K/UL Final    Sodium 02/03/2023 138  133 - 143 mmol/L Final    Potassium 02/03/2023 4.4  3.5 - 5.1 mmol/L Final    Chloride 02/03/2023 103  101 - 110 mmol/L Final    CO2 02/03/2023 30  21 - 32 mmol/L Final    Anion Gap 02/03/2023 5  2 - 11 mmol/L Final    Glucose 02/03/2023 85  65 - 100 mg/dL Final    BUN 02/03/2023 17  8 - 23 MG/DL Final    Creatinine 02/03/2023 0.84  0.8 - 1.5 MG/DL Final    Est, Glom Filt Rate 02/03/2023 >60  >60 ml/min/1.73m2 Final    Comment:      Pediatric calculator link: Doreen.at. org/professionals/kdoqi/gfr_calculatorped       These results are not intended for use in patients <25years of age. eGFR results are calculated without a race factor using  the 2021 CKD-EPI equation. Careful clinical correlation is recommended, particularly when comparing to results calculated using previous equations. The CKD-EPI equation is less accurate in patients with extremes of muscle mass, extra-renal metabolism of creatinine, excessive creatine ingestion, or following therapy that affects renal tubular secretion. Calcium 02/03/2023 9.2  8.3 - 10.4 MG/DL Final    PTT 02/03/2023 31.1  24.5 - 34.2 SEC Final    Comment: Heparin Therapeutic Range = 74 - 123 seconds  In addition to factor deficiency, monitoring heparin therapy, etc., evaluation of a prolonged aPTT result should include consideration of preanalytic variables such as heparin flush contamination, specimen integrity issues, etc.                   Patient Active Problem List   Diagnosis    ED (erectile dysfunction)    Skin cancer, basal cell    Arthritis    Prediabetes    Onychomycosis    Blepharoptosis    Eyelid twitch    Neck pain    GERD (gastroesophageal reflux disease)    Prostate cancer (HCC)    Mixed hyperlipidemia    Arthritis of knee, right    Snoring    Unilateral primary osteoarthritis, right knee    Primary osteoarthritis of left knee     XRAY:  AP, lateral, sunrise, and 45 degree  views of the left knee are reviewed. There is joint space loss, eburnated bone, and osteophyte formation present. X-ray impression:  Advanced degenerative joint disease of left knee    Assessment:   1. Arthritis of the left knee      Plan:    1. Proceed with scheduled left knee replacement       All material risks, benefits, and reasonable alternatives including postponing the procedure were discussed.  The patient does wish to proceed with the procedure at this time.          Signed By: CONCHITA Rivera  February 9, 2023

## 2023-02-16 ENCOUNTER — ANESTHESIA EVENT (OUTPATIENT)
Dept: SURGERY | Age: 78
End: 2023-02-16
Payer: MEDICARE

## 2023-02-16 NOTE — DISCHARGE INSTRUCTIONS
[facility]   Patient Discharge Instructions    Rey Corley / [id number] : 1945    Admitted (Not on file) Discharged: 2023     IF YOU HAVE ANY PROBLEMS ONCE YOU ARE AT HOME CALL THE FOLLOWING NUMBERS:   Nurse's line: [phone]  Main office number: [phone]      Medications    The medications you are to continue on are listed on the medication reconciliation sheet. Narcotic pain medications as well as supplemental iron can cause constipation. If this occurs, try over the counter stool softeners or try stopping the narcotic pain medication and/or the iron. It is important that you take the medication exactly as they are prescribed. Medications which increase your risk of blood clots are listed to stop for 5 weeks after surgery as well as medications or supplements which increase your risk of bleeding complications. Keep your medication in the bottles provided by the pharmacist and keep a list of the medication names, dosages, and times to be taken in your wallet. Do not take other medications without consulting your doctor. If you need a refill on your pain medication, please note our office is closed over the weekend. It is our office policy that on-call providers cannot refill narcotic pain medications over the weekend. If you will need a refill over the weekend, please call our office before 12pm on Friday or first thing Monday morning. Important Information    Do NOT smoke as this will greatly increase your risk of infection! Resume your prehospital diet. If you have excessive nausea or vomitting call your doctor's office     Leg swelling and warmth is normal for 6 months after surgery. If you experience swelling in your leg, elevate your leg while laying down with your toes above your heart. If you have sudden onset severe swelling with leg pain call our office. The stitches deep inside take approximately 6 months to dissolve.  There will be sharp shooting, stinging and burning pain. This is normal and will resolve between 3-6 months after surgery. Difficulty sleeping is normal following total Knee and Hip replacement. You may try melatonin, an over-the-counter sleep aid or benadryl to help with sleep. Most patients will resume sleeping through the night 8 weeks after surgery. Home Physical Therapy is arranged. Home Health will contact you within 48 hrs of discharge that you have chosen. If you have not received a call within this time frame please contact that provider you chose. You should be given this information before you leave the hospital.     You are at a risk for falls. Use the rolling walker when walking. Patients who have had a joint replacement should not drive if they are still taking narcotic pain mediation during the daytime hours. Most patients wean themselves off of pain medication within 2-5 weeks after surgery. You may drive once you discontinue the narcotic pain medication and feel comfortable enough going from gas to break while driving with your right leg. When to Call the office    - If you have a temperature greater then 101 + other symptoms that suggest illness such as nausea/vomiting, altered mental status, extreme fatigue, wound drainage, etc.  - Uncontrolled vomiting   - Loose control of your bladder or bowel function  - Are unable to bear any wieght          Total Knee Replacement: What to Expect at  [address] had a total knee replacement. The doctor replaced the worn ends of the bones that connect to your knee (thighbone and lower leg bone) with plastic and metal parts. When you leave the hospital, you should be able to move around with a walker or crutches. But you will need someone to help you at home until you have more energy and can move around better. You will go home with a bandage and stitches, staples, skin glue, or tape strips. Change the bandage as your doctor tells you to. If you have stitches or staples, your doctor will remove them about 2 weeks after your surgery. Glue or tape strips will fall off on their own over time. You may still have some mild pain, and the area may be swollen for a few months after surgery. Your knee will continue to improve for up to a year. You will probably use a walker for some time after surgery. When you are ready, you can use a cane. You may be able to walk without support after a couple weeks, or when you are comfortable. You will need to do months of physical rehabilitation (rehab) after a knee replacement. Rehab will help you strengthen the muscles of the knee and help you regain movement. After you recover, your artificial knee will allow you to do normal daily activities with less pain or no pain at all. You may be able to hike, dance, or ride a bike. Talk to your doctor about whether you can do more strenuous activities. Always tell your caregivers that you have an artificial knee. How long it will take to walk on your own, return to normal activities, and go back to work depends on your health and how well your rehabilitation (rehab) program goes. The better you do with your rehab exercises, the quicker you will get your strength and movement back. This care sheet gives you a general idea about how long it will take for you to recover. But each person recovers at a different pace. Follow the steps below to get better as quickly as possible. How can you care for yourself at home? Activity    Rest when you feel tired. You may take a nap, but don't stay in bed all day. When you sit, use a chair with arms. You can use the arms to help you stand up. Work with your physical therapist to find the best way to exercise. What you can do as your knee heals will depend on whether your new knee is cemented or uncemented. You may not be able to do certain things for a while if your new knee is uncemented.      After your knee has healed enough, you can do more strenuous activities with caution. You can golf, but you may want to use a golf cart for some time. And don't wear shoes with spikes. You can bike on a flat road or on a stationary bike. Talk to your doctor before biking uphill. Your doctor may suggest that you stay away from activities that put stress on your knee. These include tennis, badminton, contact sports like football, jumping (such as in basketball), jogging, and running. Avoid activities where you might fall. Do not sit for more than 1 hour at a time. Get up and walk around for a while before you sit again. If you must sit for a long time, prop up your leg with a chair or footstool. This will help you avoid swelling. Ask your doctor when you can drive again. It may take several weeks after knee replacement surgery before it's safe for you to drive. When you get into a car, sit on the edge of the seat. Then pull in your legs, and turn to face the front. You should be able to do many everyday activities 3 to 6 weeks after your surgery. You will probably need to take 4 to 16 weeks off from work. When you can go back to work depends on the type of work you do and how you feel. Ask your doctor when it is okay for you to have sex. For 12 weeks, do not lift anything heavier than 10 pounds and do not lift weights. Diet    By the time you leave the hospital, you should be eating your normal diet. If your stomach is upset, try bland, low-fat foods like plain rice, broiled chicken, toast, and yogurt. Your doctor may suggest that you take iron and vitamin supplements. Drink plenty of fluids (unless your doctor tells you not to). Eat healthy foods, and watch your portion sizes. Try to stay at your ideal weight. Too much weight puts more stress on your new knee. You may notice that your bowel movements are not regular right after your surgery. This is common.  Try to avoid constipation and straining with bowel movements. Drinking enough fluids, taking a stool softener, and eating foods that are good sources of fiber can help you avoid constipation. If you have not had a bowel movement after a couple of days, talk to your doctor. Medicines    Your doctor will tell you if and when you can restart your medicines. You will also get instructions about taking any new medicines. If you stopped taking aspirin or some other blood thinner, your doctor will tell you when to start taking it again. Your doctor may give you a blood-thinning medicine to prevent blood clots. If you take a blood thinner, be sure you get instructions about how to take your medicine safely. Blood thinners can cause serious bleeding problems. This medicine could be in pill form or as a shot (injection). If a shot is needed, your doctor will tell you how to do this. Be safe with medicines. Take pain medicines exactly as directed. If the doctor gave you a prescription medicine for pain, take it as prescribed. If you are not taking a prescription pain medicine, ask your doctor if you can take an over-the-counter medicine. Plan to take your pain medicine 30 minutes before exercises. It is easier to prevent pain before it starts than to stop it after it has started. If you think your pain medicine is making you sick to your stomach: Take your medicine after meals (unless your doctor has told you not to). Ask your doctor for a different pain medicine. If your doctor prescribed antibiotics, take them as directed. Do not stop taking them just because you feel better. You need to take the full course of antibiotics. Incision care    If your doctor told you how to care for your cut (incision), follow your doctor's instructions. You will have a dressing over the cut. A dressing helps the incision heal and protects it. Your doctor will tell you how to take care of this. If you did not get instructions, follow this general advice:   If you have strips of tape on the cut the doctor made, leave the tape on for a week or until it falls off. If you have stitches or staples, your doctor will tell you when to come back to have them removed. If you have skin glue on the cut, leave it on until it falls off. Skin glue is also called skin adhesive or liquid stitches. Change the bandage every day. Wash the area daily with warm water, and pat it dry. Don't use hydrogen peroxide or alcohol. They can slow healing. You may cover the area with a gauze bandage if it oozes fluid or rubs against clothing. You may shower 24 to 48 hours after surgery. Pat the incision dry. Don't swim or take a bath for the first 2 weeks, or until your doctor tells you it is okay. Exercise    Your rehab program will give you a number of exercises to do to help you get back your knee's range of motion and strength. Always do them as your therapist tells you. Ice    For pain and swelling, put ice or a cold pack on the area for 10 to 20 minutes at a time. Put a thin cloth between the ice and your skin. If your doctor recommended cold therapy using a portable machine, follow the instructions that came with the machine. Other instructions    Wear compression stockings if your doctor told you to. These may help to prevent blood clots. Your doctor will tell you how long you need to keep wearing the compression stockings. Carry a medical alert card that says you have an artificial joint. You have metal pieces in your knee. These may set off some airport metal detectors. Follow-up care is a key part of your treatment and safety. Be sure to make and go to all appointments, and call your doctor if you are having problems. It's also a good idea to know your test results and keep a list of the medicines you take. When should you call for help? Call 911 anytime you think you may need emergency care. For example, call if:    You passed out (lost consciousness).      You have severe trouble breathing. You have sudden chest pain and shortness of breath, or you cough up blood. Call your doctor now or seek immediate medical care if:    You have signs of infection, such as: Increased pain, swelling, warmth, or redness. Red streaks leading from the incision. Pus draining from the incision. A fever. You have signs of a blood clot, such as:  Pain in your calf, back of the knee, thigh, or groin. Redness and swelling in your leg or groin. Your incision comes open and begins to bleed, or the bleeding increases. You have pain that does not get better after you take pain medicine. Watch closely for changes in your health, and be sure to contact your doctor if:    You do not have a bowel movement after taking a laxative. Where can you learn more? Go to http://www.woods.com/ and enter T054 to learn more about \"Total Knee Replacement: What to Expect at Home. \"  Current as of: March 9, 2022               Content Version: 13.5  © 2006-2022 [employer]. Care instructions adapted under license by [facility] ([facility]). If you have questions about a medical condition or this instruction, always ask your healthcare professional. [place] [address] any warranty or liability for your use of this information. Information obtained by :  I understand that if any problems occur once I am at home I am to contact my physician. I understand and acknowledge receipt of the instructions indicated above.                                                                                                                                            Physician's or R.N.'s Signature                                                                  Date/Time                                                                                                                                              Patient or Representative Signature Date/Time

## 2023-02-17 ENCOUNTER — HOSPITAL ENCOUNTER (OUTPATIENT)
Age: 78
Discharge: HOME | End: 2023-02-17
Attending: ORTHOPAEDIC SURGERY | Admitting: ORTHOPAEDIC SURGERY
Payer: MEDICARE

## 2023-02-17 ENCOUNTER — ANESTHESIA (OUTPATIENT)
Dept: SURGERY | Age: 78
End: 2023-02-17
Payer: MEDICARE

## 2023-02-17 VITALS
RESPIRATION RATE: 14 BRPM | SYSTOLIC BLOOD PRESSURE: 125 MMHG | OXYGEN SATURATION: 99 % | BODY MASS INDEX: 27.99 KG/M2 | HEIGHT: 69 IN | TEMPERATURE: 98.4 F | HEART RATE: 75 BPM | WEIGHT: 189 LBS | DIASTOLIC BLOOD PRESSURE: 59 MMHG

## 2023-02-17 DIAGNOSIS — M17.12 PRIMARY OSTEOARTHRITIS OF LEFT KNEE: ICD-10-CM

## 2023-02-17 DIAGNOSIS — Z96.652 STATUS POST LEFT KNEE REPLACEMENT: Primary | ICD-10-CM

## 2023-02-17 PROCEDURE — 2500000003 HC RX 250 WO HCPCS: Performed by: NURSE ANESTHETIST, CERTIFIED REGISTERED

## 2023-02-17 PROCEDURE — 3600000005 HC SURGERY LEVEL 5 BASE: Performed by: ORTHOPAEDIC SURGERY

## 2023-02-17 PROCEDURE — C1713 ANCHOR/SCREW BN/BN,TIS/BN: HCPCS | Performed by: ORTHOPAEDIC SURGERY

## 2023-02-17 PROCEDURE — 6360000002 HC RX W HCPCS: Performed by: PHYSICIAN ASSISTANT

## 2023-02-17 PROCEDURE — 6360000002 HC RX W HCPCS: Performed by: ORTHOPAEDIC SURGERY

## 2023-02-17 PROCEDURE — 3700000001 HC ADD 15 MINUTES (ANESTHESIA): Performed by: ORTHOPAEDIC SURGERY

## 2023-02-17 PROCEDURE — 64447 NJX AA&/STRD FEMORAL NRV IMG: CPT | Performed by: ANESTHESIOLOGY

## 2023-02-17 PROCEDURE — 20985 CPTR-ASST DIR MS PX: CPT | Performed by: ORTHOPAEDIC SURGERY

## 2023-02-17 PROCEDURE — 3700000000 HC ANESTHESIA ATTENDED CARE: Performed by: ORTHOPAEDIC SURGERY

## 2023-02-17 PROCEDURE — 2500000003 HC RX 250 WO HCPCS: Performed by: ANESTHESIOLOGY

## 2023-02-17 PROCEDURE — 2500000003 HC RX 250 WO HCPCS: Performed by: ORTHOPAEDIC SURGERY

## 2023-02-17 PROCEDURE — 6360000002 HC RX W HCPCS: Performed by: NURSE ANESTHETIST, CERTIFIED REGISTERED

## 2023-02-17 PROCEDURE — 3600000015 HC SURGERY LEVEL 5 ADDTL 15MIN: Performed by: ORTHOPAEDIC SURGERY

## 2023-02-17 PROCEDURE — 27447 TOTAL KNEE ARTHROPLASTY: CPT | Performed by: ORTHOPAEDIC SURGERY

## 2023-02-17 PROCEDURE — 2720000010 HC SURG SUPPLY STERILE: Performed by: ORTHOPAEDIC SURGERY

## 2023-02-17 PROCEDURE — 97165 OT EVAL LOW COMPLEX 30 MIN: CPT

## 2023-02-17 PROCEDURE — 6360000002 HC RX W HCPCS: Performed by: ANESTHESIOLOGY

## 2023-02-17 PROCEDURE — 2580000003 HC RX 258: Performed by: ANESTHESIOLOGY

## 2023-02-17 PROCEDURE — C1776 JOINT DEVICE (IMPLANTABLE): HCPCS | Performed by: ORTHOPAEDIC SURGERY

## 2023-02-17 PROCEDURE — 2580000003 HC RX 258: Performed by: ORTHOPAEDIC SURGERY

## 2023-02-17 PROCEDURE — 2709999900 HC NON-CHARGEABLE SUPPLY: Performed by: ORTHOPAEDIC SURGERY

## 2023-02-17 PROCEDURE — 97535 SELF CARE MNGMENT TRAINING: CPT

## 2023-02-17 PROCEDURE — 7100000001 HC PACU RECOVERY - ADDTL 15 MIN: Performed by: ORTHOPAEDIC SURGERY

## 2023-02-17 PROCEDURE — 97530 THERAPEUTIC ACTIVITIES: CPT

## 2023-02-17 PROCEDURE — 97161 PT EVAL LOW COMPLEX 20 MIN: CPT

## 2023-02-17 PROCEDURE — 6370000000 HC RX 637 (ALT 250 FOR IP): Performed by: PHYSICIAN ASSISTANT

## 2023-02-17 PROCEDURE — 7100000000 HC PACU RECOVERY - FIRST 15 MIN: Performed by: ORTHOPAEDIC SURGERY

## 2023-02-17 DEVICE — TIBIAL COMPONENT
Type: IMPLANTABLE DEVICE | Site: KNEE | Status: FUNCTIONAL
Brand: TRIATHLON

## 2023-02-17 DEVICE — CEMENT BNE 20GM HALF DOSE PMMA VISC RADPQ FAST: Type: IMPLANTABLE DEVICE | Site: KNEE | Status: FUNCTIONAL

## 2023-02-17 DEVICE — CRUCIATE RETAINING FEMORAL
Type: IMPLANTABLE DEVICE | Site: KNEE | Status: FUNCTIONAL
Brand: TRIATHLON

## 2023-02-17 DEVICE — COMPONENT TOT KNEE CAPPED PRIMARY K2STRYKER] STRYKER CORP]: Type: IMPLANTABLE DEVICE | Status: FUNCTIONAL

## 2023-02-17 DEVICE — TIBIAL BEARING INSERT - CS
Type: IMPLANTABLE DEVICE | Site: KNEE | Status: FUNCTIONAL
Brand: TRIATHLON

## 2023-02-17 DEVICE — PATELLA
Type: IMPLANTABLE DEVICE | Site: KNEE | Status: FUNCTIONAL
Brand: TRIATHLON

## 2023-02-17 RX ORDER — SODIUM CHLORIDE 9 MG/ML
INJECTION, SOLUTION INTRAVENOUS PRN
Status: DISCONTINUED | OUTPATIENT
Start: 2023-02-17 | End: 2023-02-17 | Stop reason: HOSPADM

## 2023-02-17 RX ORDER — ACETAMINOPHEN 500 MG
1000 TABLET ORAL ONCE
Status: COMPLETED | OUTPATIENT
Start: 2023-02-17 | End: 2023-02-17

## 2023-02-17 RX ORDER — ONDANSETRON 2 MG/ML
4 INJECTION INTRAMUSCULAR; INTRAVENOUS EVERY 6 HOURS PRN
Status: DISCONTINUED | OUTPATIENT
Start: 2023-02-17 | End: 2023-02-17 | Stop reason: HOSPADM

## 2023-02-17 RX ORDER — LISINOPRIL 5 MG/1
5 TABLET ORAL NIGHTLY
Status: DISCONTINUED | OUTPATIENT
Start: 2023-02-17 | End: 2023-02-17 | Stop reason: HOSPADM

## 2023-02-17 RX ORDER — METHOCARBAMOL 750 MG/1
750 TABLET, FILM COATED ORAL 3 TIMES DAILY PRN
Qty: 30 TABLET | Refills: 0 | Status: SHIPPED | OUTPATIENT
Start: 2023-02-17 | End: 2023-02-27

## 2023-02-17 RX ORDER — PROMETHAZINE HYDROCHLORIDE 12.5 MG/1
12.5 TABLET ORAL 4 TIMES DAILY PRN
Qty: 20 TABLET | Refills: 2 | Status: SHIPPED | OUTPATIENT
Start: 2023-02-17

## 2023-02-17 RX ORDER — CHLOROPROCAINE HYDROCHLORIDE 30 MG/ML
INJECTION, SOLUTION EPIDURAL; INFILTRATION; INTRACAUDAL; PERINEURAL
Status: DISCONTINUED | OUTPATIENT
Start: 2023-02-17 | End: 2023-02-17

## 2023-02-17 RX ORDER — SODIUM CHLORIDE 0.9 % (FLUSH) 0.9 %
5-40 SYRINGE (ML) INJECTION EVERY 12 HOURS SCHEDULED
Status: DISCONTINUED | OUTPATIENT
Start: 2023-02-17 | End: 2023-02-17 | Stop reason: HOSPADM

## 2023-02-17 RX ORDER — SENNA AND DOCUSATE SODIUM 50; 8.6 MG/1; MG/1
1 TABLET, FILM COATED ORAL 2 TIMES DAILY
Status: DISCONTINUED | OUTPATIENT
Start: 2023-02-17 | End: 2023-02-17 | Stop reason: HOSPADM

## 2023-02-17 RX ORDER — SODIUM CHLORIDE, SODIUM LACTATE, POTASSIUM CHLORIDE, CALCIUM CHLORIDE 600; 310; 30; 20 MG/100ML; MG/100ML; MG/100ML; MG/100ML
INJECTION, SOLUTION INTRAVENOUS CONTINUOUS
Status: DISCONTINUED | OUTPATIENT
Start: 2023-02-17 | End: 2023-02-17 | Stop reason: HOSPADM

## 2023-02-17 RX ORDER — ACETAMINOPHEN 500 MG
1000 TABLET ORAL EVERY 6 HOURS
Status: DISCONTINUED | OUTPATIENT
Start: 2023-02-17 | End: 2023-02-17 | Stop reason: HOSPADM

## 2023-02-17 RX ORDER — SODIUM CHLORIDE 0.9 % (FLUSH) 0.9 %
5-40 SYRINGE (ML) INJECTION PRN
Status: DISCONTINUED | OUTPATIENT
Start: 2023-02-17 | End: 2023-02-17 | Stop reason: HOSPADM

## 2023-02-17 RX ORDER — ASPIRIN 81 MG/1
81 TABLET ORAL EVERY 12 HOURS
Qty: 70 TABLET | Refills: 0 | Status: SHIPPED | OUTPATIENT
Start: 2023-02-17 | End: 2023-03-24

## 2023-02-17 RX ORDER — ONDANSETRON 2 MG/ML
INJECTION INTRAMUSCULAR; INTRAVENOUS PRN
Status: DISCONTINUED | OUTPATIENT
Start: 2023-02-17 | End: 2023-02-17 | Stop reason: SDUPTHER

## 2023-02-17 RX ORDER — OXYCODONE HYDROCHLORIDE 5 MG/1
10 TABLET ORAL EVERY 4 HOURS PRN
Status: DISCONTINUED | OUTPATIENT
Start: 2023-02-17 | End: 2023-02-17 | Stop reason: HOSPADM

## 2023-02-17 RX ORDER — EPHEDRINE SULFATE/0.9% NACL/PF 50 MG/5 ML
SYRINGE (ML) INTRAVENOUS PRN
Status: DISCONTINUED | OUTPATIENT
Start: 2023-02-17 | End: 2023-02-17 | Stop reason: SDUPTHER

## 2023-02-17 RX ORDER — OXYCODONE HYDROCHLORIDE 5 MG/1
5-10 TABLET ORAL
Qty: 60 TABLET | Refills: 0 | Status: SHIPPED | OUTPATIENT
Start: 2023-02-17 | End: 2023-02-21 | Stop reason: SDUPTHER

## 2023-02-17 RX ORDER — MIDAZOLAM HYDROCHLORIDE 1 MG/ML
4 INJECTION INTRAMUSCULAR; INTRAVENOUS
Status: COMPLETED | OUTPATIENT
Start: 2023-02-17 | End: 2023-02-17

## 2023-02-17 RX ORDER — ONDANSETRON 4 MG/1
4 TABLET, ORALLY DISINTEGRATING ORAL EVERY 8 HOURS PRN
Status: DISCONTINUED | OUTPATIENT
Start: 2023-02-17 | End: 2023-02-17 | Stop reason: HOSPADM

## 2023-02-17 RX ORDER — FENTANYL CITRATE 50 UG/ML
100 INJECTION, SOLUTION INTRAMUSCULAR; INTRAVENOUS
Status: COMPLETED | OUTPATIENT
Start: 2023-02-17 | End: 2023-02-17

## 2023-02-17 RX ORDER — ATORVASTATIN CALCIUM 10 MG/1
10 TABLET, FILM COATED ORAL NIGHTLY
Status: DISCONTINUED | OUTPATIENT
Start: 2023-02-17 | End: 2023-02-17 | Stop reason: HOSPADM

## 2023-02-17 RX ORDER — OXYCODONE HYDROCHLORIDE 5 MG/1
5 TABLET ORAL
Status: DISCONTINUED | OUTPATIENT
Start: 2023-02-17 | End: 2023-02-17 | Stop reason: HOSPADM

## 2023-02-17 RX ORDER — KETOROLAC TROMETHAMINE 30 MG/ML
INJECTION, SOLUTION INTRAMUSCULAR; INTRAVENOUS PRN
Status: DISCONTINUED | OUTPATIENT
Start: 2023-02-17 | End: 2023-02-17 | Stop reason: HOSPADM

## 2023-02-17 RX ORDER — ONDANSETRON 2 MG/ML
4 INJECTION INTRAMUSCULAR; INTRAVENOUS
Status: DISCONTINUED | OUTPATIENT
Start: 2023-02-17 | End: 2023-02-17 | Stop reason: HOSPADM

## 2023-02-17 RX ORDER — DIPHENHYDRAMINE HCL 25 MG
25 CAPSULE ORAL EVERY 6 HOURS PRN
Status: DISCONTINUED | OUTPATIENT
Start: 2023-02-17 | End: 2023-02-17 | Stop reason: HOSPADM

## 2023-02-17 RX ORDER — ROPIVACAINE HYDROCHLORIDE 2 MG/ML
INJECTION, SOLUTION EPIDURAL; INFILTRATION; PERINEURAL
Status: COMPLETED | OUTPATIENT
Start: 2023-02-17 | End: 2023-02-17

## 2023-02-17 RX ORDER — ACETAMINOPHEN 500 MG
1000 TABLET ORAL EVERY 6 HOURS PRN
Qty: 120 TABLET | Refills: 3 | Status: SHIPPED | OUTPATIENT
Start: 2023-02-17

## 2023-02-17 RX ORDER — ROPIVACAINE HYDROCHLORIDE 2 MG/ML
INJECTION, SOLUTION EPIDURAL; INFILTRATION; PERINEURAL PRN
Status: DISCONTINUED | OUTPATIENT
Start: 2023-02-17 | End: 2023-02-17 | Stop reason: HOSPADM

## 2023-02-17 RX ORDER — TRANEXAMIC ACID 100 MG/ML
INJECTION, SOLUTION INTRAVENOUS PRN
Status: DISCONTINUED | OUTPATIENT
Start: 2023-02-17 | End: 2023-02-17 | Stop reason: SDUPTHER

## 2023-02-17 RX ORDER — HYDROMORPHONE HYDROCHLORIDE 2 MG/ML
1 INJECTION, SOLUTION INTRAMUSCULAR; INTRAVENOUS; SUBCUTANEOUS
Status: DISCONTINUED | OUTPATIENT
Start: 2023-02-17 | End: 2023-02-17 | Stop reason: HOSPADM

## 2023-02-17 RX ORDER — PROPOFOL 10 MG/ML
INJECTION, EMULSION INTRAVENOUS CONTINUOUS PRN
Status: DISCONTINUED | OUTPATIENT
Start: 2023-02-17 | End: 2023-02-17 | Stop reason: SDUPTHER

## 2023-02-17 RX ORDER — DEXAMETHASONE SODIUM PHOSPHATE 10 MG/ML
INJECTION INTRAMUSCULAR; INTRAVENOUS PRN
Status: DISCONTINUED | OUTPATIENT
Start: 2023-02-17 | End: 2023-02-17 | Stop reason: SDUPTHER

## 2023-02-17 RX ORDER — LIDOCAINE HYDROCHLORIDE 10 MG/ML
1 INJECTION, SOLUTION INFILTRATION; PERINEURAL
Status: DISCONTINUED | OUTPATIENT
Start: 2023-02-17 | End: 2023-02-17 | Stop reason: HOSPADM

## 2023-02-17 RX ORDER — ASPIRIN 81 MG/1
81 TABLET ORAL 2 TIMES DAILY
Status: DISCONTINUED | OUTPATIENT
Start: 2023-02-17 | End: 2023-02-17 | Stop reason: HOSPADM

## 2023-02-17 RX ORDER — DIPHENHYDRAMINE HYDROCHLORIDE 50 MG/ML
25 INJECTION INTRAMUSCULAR; INTRAVENOUS EVERY 6 HOURS PRN
Status: DISCONTINUED | OUTPATIENT
Start: 2023-02-17 | End: 2023-02-17 | Stop reason: HOSPADM

## 2023-02-17 RX ADMIN — SODIUM CHLORIDE, SODIUM LACTATE, POTASSIUM CHLORIDE, AND CALCIUM CHLORIDE: 600; 310; 30; 20 INJECTION, SOLUTION INTRAVENOUS at 09:08

## 2023-02-17 RX ADMIN — Medication 10 MG: at 09:03

## 2023-02-17 RX ADMIN — ACETAMINOPHEN 1000 MG: 500 TABLET, FILM COATED ORAL at 06:41

## 2023-02-17 RX ADMIN — MIDAZOLAM 3 MG: 1 INJECTION INTRAMUSCULAR; INTRAVENOUS at 07:23

## 2023-02-17 RX ADMIN — ONDANSETRON 4 MG: 2 INJECTION INTRAMUSCULAR; INTRAVENOUS at 08:18

## 2023-02-17 RX ADMIN — MEPIVACAINE HYDROCHLORIDE 60 MG: 20 INJECTION, SOLUTION EPIDURAL; INFILTRATION at 08:10

## 2023-02-17 RX ADMIN — PROPOFOL 75 MCG/KG/MIN: 10 INJECTION, EMULSION INTRAVENOUS at 08:15

## 2023-02-17 RX ADMIN — OXYCODONE HYDROCHLORIDE 10 MG: 5 TABLET ORAL at 13:22

## 2023-02-17 RX ADMIN — ROPIVACAINE HYDROCHLORIDE 20 ML: 2 INJECTION, SOLUTION EPIDURAL; INFILTRATION at 07:20

## 2023-02-17 RX ADMIN — TRANEXAMIC ACID 1000 MG: 100 INJECTION, SOLUTION INTRAVENOUS at 08:07

## 2023-02-17 RX ADMIN — SODIUM CHLORIDE, SODIUM LACTATE, POTASSIUM CHLORIDE, AND CALCIUM CHLORIDE: 600; 310; 30; 20 INJECTION, SOLUTION INTRAVENOUS at 07:58

## 2023-02-17 RX ADMIN — Medication 10 MG: at 08:29

## 2023-02-17 RX ADMIN — DEXAMETHASONE SODIUM PHOSPHATE 10 MG: 10 INJECTION INTRAMUSCULAR; INTRAVENOUS at 08:18

## 2023-02-17 RX ADMIN — Medication 10 MG: at 09:21

## 2023-02-17 RX ADMIN — FENTANYL CITRATE 50 MCG: 50 INJECTION, SOLUTION INTRAMUSCULAR; INTRAVENOUS at 07:23

## 2023-02-17 RX ADMIN — DEXAMETHASONE SODIUM PHOSPHATE 4 MG: 4 INJECTION, SOLUTION INTRAMUSCULAR; INTRAVENOUS at 07:20

## 2023-02-17 RX ADMIN — Medication 2000 MG: at 07:59

## 2023-02-17 RX ADMIN — Medication 10 MG: at 08:23

## 2023-02-17 RX ADMIN — DEXAMETHASONE SODIUM PHOSPHATE 10 MG: 10 INJECTION INTRAMUSCULAR; INTRAVENOUS at 08:43

## 2023-02-17 ASSESSMENT — KOOS JR
TWISING OR PIVOTING ON KNEE: 2
HOW SEVERE IS YOUR KNEE STIFFNESS AFTER FIRST WAKING IN MORNING: 2
KOOS JR TOTAL INTERVAL SCORE: 52.465
STRAIGHTENING KNEE FULLY: 2
STANDING UPRIGHT: 2
BENDING TO THE FLOOR TO PICK UP OBJECT: 2
RISING FROM SITTING: 2
GOING UP OR DOWN STAIRS: 2

## 2023-02-17 ASSESSMENT — PAIN DESCRIPTION - LOCATION: LOCATION: KNEE

## 2023-02-17 ASSESSMENT — PAIN DESCRIPTION - ORIENTATION: ORIENTATION: LEFT

## 2023-02-17 ASSESSMENT — PAIN DESCRIPTION - DESCRIPTORS: DESCRIPTORS: ACHING

## 2023-02-17 ASSESSMENT — PAIN SCALES - GENERAL: PAINLEVEL_OUTOF10: 6

## 2023-02-17 NOTE — PROGRESS NOTES
TRANSFER - IN REPORT:    Verbal report received from [facility] on Lata Block  being received from PACU for routine post-op      Report consisted of patient's Situation, Background, Assessment and   Recommendations(SBAR). Information from the following report(s) Nurse Handoff Report, Index, Adult Overview, Surgery Report, Intake/Output, and MAR was reviewed with the receiving nurse. Opportunity for questions and clarification was provided. Assessment completed upon patient's arrival to unit and care assumed.

## 2023-02-17 NOTE — OP NOTE
[address] [place] [address] Robotic Assisted Total Knee Arthroplasty: Posterior Cruciate Retaining       Patient:Jaguar Alvarez   : 1945  Medical Record OTLATF:[id number]  Pre-operative Diagnosis:  Primary osteoarthritis of left knee [M17.12]  Post-operative Diagnosis: Primary osteoarthritis of left knee [M17.12]  Location: [address] [place] [address]  Surgeon: Sheree Salinas MD   Assistant: Aneta Kahn    Anesthesia: Spinal and ACB    Indications: Patient has end stage arthritis. They have tried and failed conservative management. Procedure:Procedure(s) (LRB):  lt KNEE TOTAL ARTHROPLASTY ROBOTIC/ SDD (Left)            CPT- 35151- Total knee arthroplasty           61064- Other procedures on musculoskeletal system            0055T- Computer assisted surgical navigation   The complexity of the total joint surgery requires the use of a first assistant for positioning, retraction and expertise in closure. Tourniquet Time: 0 minutes  EBL: 250 cc  Findings: severe degenerative arthritis with loss of cartilage in weight bearing compartments of the knee,  patellar osteophytes with loss of patella cartilage, posterior femoral osteophytes   BMI: Body mass index is 27.91 kg/m². Alf Ramirez was brought to the operating room and positioned on the operating table. He was anesthestized with anesthesia. IV antibiotics were administered. Prior to the incision being made a timeout was called identifying the patient, procedure ,operative side and surgeon The operative leg was prepped and draped in the usual sterile manner. An anterior longitudinal incision was accomplished just medial to the tibial tubercle and extending approximal 6 centimeters proximal to the superior pole of the patella. A medial parapatellar capsular incision was performed. The medial capsular flap was elevated around to the insertion of the semimembranous tendon.   The patella was everted and the knee flexed and externally rotated. The medial and external menisci were excised. The lateral half of the fat pad excised and the patella femoral ligament was released. The anterior cruciate ligament was resect and the posterior cruciate ligament was retained. The femoral and tibial arrays were pinned in place and registered with the [employer]. The patient landmarks were collected and the tibial and femoral checkpoints were registered and verified. The preresection balancing was performed. The distal femur was addressed first. Utilizing the [employer] [facility] robotic arm the distal femoral cut was made. The anterior and posterior cuts were then made. The osteophytes were removed from the tibial and femoral surfaces. The tibia was then addressed. The [employer] robotic arm was then used to make the measured resection of the tibia. The tibia was sized. The tibial base plate was pinned into place with the appropriate external rotation and stem site prepared. A trial femoral component and poly was placed. A preliminary range of motion was accomplished with the trial components. The patient was found to obtain full extension as well as appropriate flexion. The patient's ligaments were stable in flexion and extension to medial and lateral stressing and the alignment was through the appropriate mechanical axis. Additional surgical procedures included: none. The patella was then everted. The bone was resect to accommodate the three peg patellar button. A trial reduction of the patella revealed appropriate tracking through the patellofemoral groove with no lateral retinacular release being accomplished. All trial components were removed. The real implants were opened: Sizes listed below. The knee was irrigated. There were no femoral deficiencies. There were no tibial deficiencies. No augmentation was utilized. The tibial and femoral components were impacted into place.  The patella component was cemented into place.    Luis Estrella knee was placed through range of motion and noted to be stable as mentioned above with the trail components. The wound was dry, therefore no drain was used. The operative knee was injected with 60 cc of Naropin, 10 cc's of morphine and 1 cc of 30 mg of Toradol. The knee was then soaked with a diluted betadine solution for approximately 3 min. This was then thoroughly irrigated. The capsular layer was closed using a #1 Stratafix suture. Then, 1 gram (100 mg/ml) of Transexamic Acid was injected into the joint space. The subcutaneous layers were closed using 2-0 Stratafix. Finally the skin was closed using 3-0 Vicryl and staples which were applied in occlusive fashion and sterile bandage applied. An Iceman cryo pad was applied on the operative leg. Sponge count and needle counts were correct. Luis Estrella left the operating room     Implants:   Implant Name Type Inv.  Item Serial No.  Lot No. LRB No. Used Action   CEMENT BNE 20GM HALF DOSE PMMA VISC RADPQ FAST - [id number]  CEMENT BNE 20GM HALF DOSE PMMA VISC RADPQ FAST  [employer]- [id number] Left 1 Implanted   COMPONENT FEM SZ 6 L KNEE CRUCE RET CEMENTLESS BEAD W/ SERGIO - [id number]  COMPONENT FEM SZ 6 L KNEE CRUCE RET CEMENTLESS BEAD W/ SERGIO  [employer]- [id number] Left 1 Implanted   BASEPLATE TIB SZ 6 [id number] ML77MM KNEE TRITANIUM 4 CRUCFRM - [id number]  BASEPLATE TIB SZ 6 [id number] ML77MM KNEE TRITANIUM 4 CRUCFRM  [employer] X[employer]-WD [id number] Left 1 Implanted   COMPONENT PATELLAR ASYM 38X11 MM KNEE STRL TRIATHLON - [id number]  COMPONENT PATELLAR ASYM 38X11 MM KNEE STRL TRIATHLON  [employer] X[employer]-WD [id number] Left 1 Implanted   INSERT TIB CNDYL STBL 6 9 MM KNEE 5/PK X3 TRIATHLON - [id number]  INSERT TIB CNDYL STBL 6 9 MM KNEE 5/PK X3 TRIATHLON  [employer] HOW-WD [id number] Left 1 Implanted         Signed By: Jennifer King MD   2/17/2023,  10:06 AM

## 2023-02-17 NOTE — PERIOP NOTE
TRANSFER - OUT REPORT:    Verbal report given to [facility] on Taye Sexton  being transferred to [address] [place] [address] for routine post-op       Report consisted of patients Situation, Background, Assessment and   Recommendations(SBAR). Information from the following report(s) Nurse Handoff Report, Surgery Report, and MAR was reviewed with the receiving nurse. Opportunity for questions and clarification was provided.       Patient transported with:   [employer]

## 2023-02-17 NOTE — ANESTHESIA PROCEDURE NOTES
Spinal Block    Patient location during procedure: OR  End time: 2/17/2023 8:10 AM  Reason for block: primary anesthetic  Staffing  Performed: anesthesiologist   Anesthesiologist: Tanya Martínez MD  Spinal Block  Patient position: sitting  Prep: ChloraPrep  Patient monitoring: cardiac monitor, continuous pulse ox and frequent blood pressure checks  Approach: midline  Location: L2/L3  Provider prep: mask and sterile gloves  Local infiltration: lidocaine  Needle  Needle type: Quincke   Needle gauge: 22 G  Needle length: 3.5 in  Needle insertion depth: 3 cm  Assessment  Sensory level: T6  Swirl obtained: Yes  CSF: clear  Attempts: 1  Hemodynamics: stable  Preanesthetic Checklist  Completed: patient identified, IV checked, site marked, risks and benefits discussed, surgical/procedural consents, equipment checked, pre-op evaluation, timeout performed, anesthesia consent given, oxygen available, monitors applied/VS acknowledged, fire risk safety assessment completed and verbalized and blood product R/B/A discussed and consented

## 2023-02-17 NOTE — PROGRESS NOTES
OCCUPATIONAL THERAPY Initial Assessment and PM      (Link to Caseload Tracking: OT Visit Days: 1  OT Orders   Time  OT Charge Capture  Rehab Caseload Tracker  Episode     Rozina Aguila is a 78 y.o. male   PRIMARY DIAGNOSIS: Status post left knee replacement  Primary osteoarthritis of left knee [M17.12]  Procedure(s) (LRB):  lt KNEE TOTAL ARTHROPLASTY ROBOTIC/ SDD (Left)  Day of Surgery  Reason for Referral: Pain in Left Knee (M25.562)  Stiffness of Left Knee, Not elsewhere classified (F81.036)  Other lack of cordination (R27.8)  Difficulty in walking, Not elsewhere classified (R26.2)  Other abnormalities of gait and mobility (R26.89)  Outpatient in a bed: Payor: MEDICARE / Plan: MEDICARE PART A AND B / Product Type: *No Product type* /     ASSESSMENT:     REHAB RECOMMENDATIONS:   Recommendation to date pending progress:  Setting:  Home Health Therapy    Equipment:    None     ASSESSMENT:  Mr. Ramo Parrish is s/p left TKA and presents with decreased independence with functional mobility and activities of daily living as compared to baseline level of function and safety. Patient would benefit from skilled Occupational Therapy to maximize independence and safety with self-care task and functional mobility. Patient supine in bed. He transferred to EOB with Sba. He ambulated to the bathroom with CGA. He toileted and ambulated back to the eob. He dressed and ambulated down the hallway and performed steps. He had his other TKA last year, he did well went home same day. He and his wife educated on post op day 1 Adl task performance and home safety. Will follow.        [employer] AM-PAC 6 Clicks Daily Activity Inpatient Short Form:    AM-PAC Daily Activity - Inpatient   How much help is needed for putting on and taking off regular lower body clothing?: A Little  How much help is needed for bathing (which includes washing, rinsing, drying)?: A Little  How much help is needed for toileting (which includes using toilet, bedpan, or urinal)?: A Little  How much help is needed for putting on and taking off regular upper body clothing?: None  How much help is needed for taking care of personal grooming?: None  How much help for eating meals?: None  AM-PAC Inpatient Daily Activity Raw Score: 21  AM-PAC Inpatient ADL T-Scale Score : 44.27  ADL Inpatient CMS 0-100% Score: 32.79  ADL Inpatient CMS G-Code Modifier : CJ     SUBJECTIVE:     Mr. Tisha Lewis stated he wanted to go home today      Social/Functional   Lives With: Spouse  Type of Home: House  Home Layout: Two level, Able to Live on Main level with bedroom/bathroom (16 steps to second level with 1 rail)  Home Access: Stairs to enter without rails  Entrance Stairs - Number of Steps: 2  Bathroom Shower/Tub: Walk-in shower  Bathroom Toilet: Standard  Bathroom Equipment: Shower chair, 3-in-1 commode  Home Equipment: Cane  ADL Assistance: Independent  Homemaking Assistance: Independent  Ambulation Assistance: Independent  Transfer Assistance: Independent  Mode of Transportation: Car  Occupation: Retired    OBJECTIVE:     ASTON / Yuko Staples / [place]: NA    RESTRICTIONS/PRECAUTIONS:  Restrictions/Precautions: Weight Bearing  Left Lower Extremity Weight Bearing: Weight Bearing As Tolerated  fall  PAIN: Froilan Males / O2:   Pre Treatment:      3/10    Post Treatment: 3/10 Vitals          Oxygen        GROSS EVALUATION: INTACT IMPAIRED   (See Comments)   UE AROM [x] []   UE PROM [] []   Strength [x]       Posture / Balance []  Cga in standing   Sensation []     Coordination []  Cga in standing     Tone []       Edema []    Activity Tolerance []    Fair + with mobility   Hand Dominance R [] L []      COGNITION/  PERCEPTION: INTACT IMPAIRED   (See Comments)   Orientation [x]     Vision [x]     Hearing [x]     Cognition  [x]     Perception [x]       MOBILITY: I Mod I S SBA CGA Min Mod Max Total  NT x2 Comments:   Bed Mobility    Rolling [] [] [] [] [] [] [] [] [] [] []    Supine to Sit [] [] [] [x] [] [] [] [] [] [] []    Scooting [] [] [] [x] [] [] [] [] [] [] []    Sit to Supine [] [] [] [] [] [] [] [] [] [] []    Transfers    Sit to Stand [] [] [] [] [x] [] [] [] [] [] []    Bed to Chair [] [] [] [] [x] [] [] [] [] [] []    Stand to Sit [] [] [] [] [x] [] [] [] [] [] []    Tub/Shower [] [] [] [] [] [] [] [] [] [] []       Toilet [] [] [] [] [x] [] [] [] [] [] []        [] [] [] [] [] [] [] [] [] [] []    I=Independent, Mod I=Modified Independent, S=Supervision/Setup, SBA=Standby Assistance, CGA=Contact Guard Assistance, Min=Minimal Assistance, Mod=Moderate Assistance, Max=Maximal Assistance, Total=Total Assistance, NT=Not Tested    ACTIVITIES OF DAILY LIVING: I Mod I S SBA CGA Min Mod Max Total NT Comments   BASIC ADLs:              Upper Body Bathing [] [] [] [] [] [] [] [] [] []    Lower Body Bathing [] [] [] [] [] [] [] [] [] []    Toileting [] [] [] [] [x] [] [] [] [] []    Upper Body Dressing [] [] [x] [] [] [] [] [] [] []    Lower Body Dressing [] [] [] [] [] [x] [] [] [] []    Feeding [] [] [x] [] [] [] [] [] [] []    Grooming [] [] [] [] [] [] [] [] [] []    Personal Device Care [] [] [] [] [] [] [] [] [] []    Functional Mobility [] [] [] [] [x] [] [] [] [] []    I=Independent, Mod I=Modified Independent, S=Supervision/Setup, SBA=Standby Assistance, CGA=Contact Guard Assistance, Min=Minimal Assistance, Mod=Moderate Assistance, Max=Maximal Assistance, Total=Total Assistance, NT=Not Tested    PLAN:     FREQUENCY/DURATION   OT Plan of Care: 1 time/week, 2 times/week for duration of hospital stay or until stated goals are met, whichever comes first.    ACUTE OCCUPATIONAL THERAPY GOALS:   (Developed with and agreed upon by patient and/or caregiver.)    GOALS:   DISCHARGE GOALS (in preparation for going home/rehab):  3 days  1. Mr. Bolivar Lackey will perform lower body dressing activity with minimal assist required to demonstrate improved functional mobility and safety. -GOAL MET 2/17/23     2.   Mr. Bolivar Lackey will perform bathing activity with minimal assist required to demonstrate improved functional mobility and safety. 3.  Mr. Elidia Fernandez will perform toileting activity with  contact guard assist to demonstrate improved functional mobility and safety. -GOAL MET 2/17/23     4. Mr. Elidia Fernandez will perform all functional transfers transfer with contact guard assist to demonstrate improved functional mobility and safety. 5. Pt will be able to perform self care with stand by to minimal assistance and ambulate short distances with family that is capable of assisting patient. -GOAL MET 2/17/23         PROBLEM LIST:   (Skilled intervention is medically necessary to address:)  Decreased ADL/Functional Activities  Decreased Activity Tolerance  Decreased Balance  Decreased Coordination  Decreased Gait Ability  Decreased Safety Awareness  Decreased Strength  Decreased Transfer Abilities  Increased Pain   INTERVENTIONS PLANNED:   (Benefits and precautions of occupational therapy have been discussed with the patient.)  Self Care Training  Therapeutic Activity  Therapeutic Exercise/HEP  Neuromuscular Re-education  Education         TREATMENT:     EVALUATION: LOW COMPLEXITY: (Untimed Charge)    TREATMENT:   SELF CARE: (15 minutes):   Procedure(s) (per grid) utilized to improve and/or restore self-care/home management as related to dressing, bathing, toileting, grooming, and functional mobility . Required minimal visual, verbal, manual, and tactile cueing to facilitate activities of daily living skills, compensatory activities, and OT poc, discharge planning, adl task performance and post op showering .       AFTER TREATMENT PRECAUTIONS: Bed/Chair Locked, Call light within reach, Chair, Needs within reach, RN notified, Visitors at bedside, and working with PT    INTERDISCIPLINARY COLLABORATION:  RN/ PCT, PT/ PTA, and OT/ WILHELM    EDUCATION:  Education Given To: Patient, Family  Education Provided: Plan of Care, Precautions, Role of Therapy, ADL Adaptive Strategies, Transfer Training, Fall Prevention Strategies, Equipment, Family Education  Education Method: Verbal, Demonstration  Barriers to Learning: None  Education Outcome: Verbalized understanding, Demonstrated understanding  [x] Safe And Effective Hygiene  [x] Fall Precautions  [] Hip Precautions  [] D/C Instruction Review [] Prosthesis Review  [x] Walker Management/Safety  [x] Adaptive Equipment as Needed  [] Therapeutic Resting Position of Joint       TOTAL TREATMENT DURATION AND TIME:  Time In: 1325  Time Out: 1400 [facility]  Minutes: 1700 [facility], OT

## 2023-02-17 NOTE — INTERVAL H&P NOTE
Update History & Physical    The patient's History and Physical of February 9, H&P was reviewed with the patient and I examined the patient. There was no change. The surgical site was confirmed by the patient and me. Plan: The risks, benefits, expected outcome, and alternative to the recommended procedure have been discussed with the patient. Patient understands and wants to proceed with the procedure.      Electronically signed by Parmjit Garcia MD on 2/17/2023 at 6:45 AM

## 2023-02-17 NOTE — ANESTHESIA PROCEDURE NOTES
Peripheral Block    Patient location during procedure: procedure area  Reason for block: post-op pain management and at surgeon's request  Start time: 2/17/2023 7:20 AM  End time: 2/17/2023 7:23 AM  Staffing  Performed: anesthesiologist   Anesthesiologist: Guicho Whaley MD  Preanesthetic Checklist  Completed: patient identified, IV checked, site marked, risks and benefits discussed, surgical/procedural consents, equipment checked, pre-op evaluation, timeout performed, anesthesia consent given, oxygen available, monitors applied/VS acknowledged, fire risk safety assessment completed and verbalized and blood product R/B/A discussed and consented  Peripheral Block   Patient position: supine  Prep: ChloraPrep  Provider prep: mask and sterile gloves  Patient monitoring: cardiac monitor, continuous pulse ox, frequent blood pressure checks, IV access, oxygen and responsive to questions  Block type: Femoral  Adductor canal  Laterality: left  Injection technique: single-shot  Guidance: ultrasound guided    Needle   Needle type: insulated echogenic nerve stimulator needle   Needle gauge: 20 G  Needle localization: ultrasound guidance  Needle insertion depth: 4 cm  Needle length: 8 cm  Assessment   Injection assessment: negative aspiration for heme, no paresthesia on injection, local visualized surrounding nerve on ultrasound and no intravascular symptoms  Slow fractionated injection: yes  Hemodynamics: stable  Real-time US image taken/store: yes  Outcomes: uncomplicated    Additional Notes  Potential access sites were examined with ultrasound and the acceptable patent access site was selected (site noted above). The needle path and vein access were visualized in real time using ultrasonography, and an image was recorded for permanent record.      0.2 % Ropivacaine with 4 mg decadron + epi  Medications Administered  dexamethasone 4 MG/ML - Perineural   4 mg - 2/17/2023 7:20:00 AM  ropivacaine (NAROPIN) injection 0.2% - Perineural   20 mL - 2/17/2023 7:20:00 AM

## 2023-02-17 NOTE — ANESTHESIA PRE PROCEDURE
Department of Anesthesiology  Preprocedure Note       Name:  Karl Kelly   Age:  78 y.o.  :  1945                                          MRN:  [id number]         Date:  2023      Surgeon: Alesia Roper):  Scarlet Gomes MD    Procedure: Procedure(s):  lt KNEE TOTAL ARTHROPLASTY ROBOTIC/ SDD    Medications prior to admission:   Prior to Admission medications    Medication Sig Start Date End Date Taking? Authorizing Provider   calcium carbonate (TUMS) 500 MG chewable tablet Take 2 tablets by mouth as needed for Heartburn    Historical Provider, MD   lisinopril (PRINIVIL;ZESTRIL) 5 MG tablet Take 5 mg by mouth at bedtime 12/15/22   Historical Provider, MD   GLUCOSAMINE-CHONDROITIN PO Take 1 tablet by mouth daily    Historical Provider, MD   Coenzyme Q10 (COQ10 PO) Take by mouth    Historical Provider, MD   Omega-3 Fatty Acids (FISH OIL PO) Take by mouth Twice a Week    Historical Provider, MD   VITAMIN A PO Take by mouth daily    Historical Provider, MD   VITAMIN K PO Take by mouth daily    Historical Provider, MD   aspirin EC 81 MG EC tablet Take 1 tablet by mouth in the morning and 1 tablet in the evening.  9/14/22 10/19/22  CONCHITA Moreno   promethazine (PHENERGAN) 25 MG tablet Take 1 tablet by mouth every 8 hours as needed for Nausea 22   CONCHITA Moreno   methocarbamol (ROBAXIN-750) 750 MG tablet Take 1 tablet by mouth 3 times daily as needed (muscle spasm or cramps) 22   CONCHITA Moreno   Cholecalciferol (VITAMIN D-3) 25 MCG (1000 UT) CAPS Take 1 tablet by mouth every morning    Historical Provider, MD   meloxicam (MOBIC) 15 MG tablet Take 15 mg by mouth as needed for Pain    Historical Provider, MD   ibuprofen (ADVIL;MOTRIN) 200 MG tablet Take 200 mg by mouth every 6 hours as needed for Pain  22  Historical Provider, MD   acetaminophen (TYLENOL) 500 MG tablet Take 1,000 mg by mouth every 6 hours as needed for Pain    Ar Automatic Reconciliation   simvastatin (ZOCOR) 20 MG tablet Take 20 mg by mouth nightly 12/21/15   Ar Automatic Reconciliation   tadalafil (CIALIS) 5 MG tablet Take 5 mg by mouth as needed for Erectile Dysfunction 12/21/15   Ar Automatic Reconciliation   glucosamine-chondroitin 750-600 MG TABS tablet Take by mouth  9/14/22  Ar Automatic Reconciliation       Current medications:    No current facility-administered medications for this visit. No current outpatient medications on file.      Facility-Administered Medications Ordered in Other Visits   Medication Dose Route Frequency Provider Last Rate Last Admin    lidocaine 1 % injection 1 mL  1 mL IntraDERmal Once PRN Frieda Vallecillo MD        lactated ringers IV soln infusion   IntraVENous Continuous Frieda Vallecillo MD        sodium chloride flush 0.9 % injection 5-40 mL  5-40 mL IntraVENous 2 times per day CONCHITA Whiting        sodium chloride flush 0.9 % injection 5-40 mL  5-40 mL IntraVENous PRN CONCHITA Whiting        0.9 % sodium chloride infusion   IntraVENous PRN CONCHITA Whiting        ceFAZolin (ANCEF) 2000 mg in sterile water 20 mL IV syringe  2,000 mg IntraVENous On Call to [address] [place] [address]           Allergies:  No Known Allergies    Problem List:    Patient Active Problem List   Diagnosis Code    ED (erectile dysfunction) N52.9    Skin cancer, basal cell C44.91    Arthritis M19.90    Prediabetes R73.03    Onychomycosis B35.1    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    Eyelid twitch R25.3    Neck pain M54.2    GERD (gastroesophageal reflux disease) K21.9    Prostate cancer ([facility] [address] C61    Mixed hyperlipidemia E78.2    Arthritis of knee, right M17.11    Snoring R06.83    Unilateral primary osteoarthritis, right knee M17.11    Primary osteoarthritis of left knee M17.12       Past Medical History:        Diagnosis Date    Arthritis     OA    COVID-19 07/16/2022    mild cough    Erectile dysfunction     cialis prn    GERD (gastroesophageal reflux disease)     seldom, TUMS prn- well controlled    Hypertension     on med for control    Inguinal hernia     right    Mixed hyperlipidemia     on med for control    Palpitations     Personal history of malignant neoplasm of prostate     Prostate cancer ([facility] [address] 03/2008    radiation 2007, prostatectomy 2008    Skin cancer, basal cell     skin basal cell, shoulder and back       Past Surgical History:        Procedure Laterality Date    COLONOSCOPY      HEENT  01/2022    implant/dental    HERNIA REPAIR  6/4/2013    ORTHOPEDIC SURGERY Right 02/10/2022    RIGHT FIRST METATARSOPHALANGEAL CHEILECTOMY    PROSTATECTOMY  2008    followed by radiation in 2012   [address] Right 09/14/2022    rt KNEE TOTAL ARTHROPLASTY ROBOTIC/ SDD performed by Doris Campos MD at [facility]       Social History:    Social History     Tobacco Use    Smoking status: Never     Passive exposure: Past (parents and work environment)    Smokeless tobacco: Never   Substance Use Topics    Alcohol use: Yes     Comment: occasionally                                Counseling given: Not Answered      Vital Signs (Current): There were no vitals filed for this visit.                                            BP Readings from Last 3 Encounters:   02/17/23 (!) 113/55   02/03/23 134/75   09/14/22 135/75       NPO Status:                                                                                 BMI:   Wt Readings from Last 3 Encounters:   02/17/23 189 lb (85.7 kg)   02/03/23 184 lb (83.5 kg)   09/14/22 194 lb (88 kg)     There is no height or weight on file to calculate BMI.    CBC:   Lab Results   Component Value Date/Time    WBC 4.9 02/03/2023 12:31 PM    RBC 4.41 02/03/2023 12:31 PM    HGB 13.9 02/03/2023 12:31 PM    HCT 42.5 02/03/2023 12:31 PM    MCV 96.4 02/03/2023 12:31 PM    RDW 13.3 02/03/2023 12:31 PM     02/03/2023 12:31 PM       CMP:   Lab Results   Component Value Date/Time     02/03/2023 12:31 PM    K 4.4 02/03/2023 12:31 PM  02/03/2023 12:31 PM    CO2 30 02/03/2023 12:31 PM    BUN 17 02/03/2023 12:31 PM    CREATININE 0.84 02/03/2023 12:31 PM    GFRAA >60 07/26/2022 08:03 AM    AGRATIO 0.9 12/12/2021 07:01 PM    LABGLOM >60 02/03/2023 12:31 PM    GLUCOSE 85 02/03/2023 12:31 PM    PROT 7.3 12/12/2021 07:01 PM    CALCIUM 9.2 02/03/2023 12:31 PM    BILITOT 0.4 12/12/2021 07:01 PM    ALKPHOS 79 12/12/2021 07:01 PM    AST 18 12/12/2021 07:01 PM    ALT 22 12/12/2021 07:01 PM       POC Tests: No results for input(s): POCGLU, POCNA, POCK, POCCL, POCBUN, POCHEMO, POCHCT in the last 72 hours. Coags:   Lab Results   Component Value Date/Time    PROTIME 13.5 02/03/2023 12:31 PM    INR 1.0 02/03/2023 12:31 PM    APTT 31.1 02/03/2023 12:31 PM       HCG (If Applicable): No results found for: PREGTESTUR, PREGSERUM, HCG, HCGQUANT     ABGs: No results found for: PHART, PO2ART, OIC1UIN, FSQ8DOG, BEART, G9YRVBRZ     Type & Screen (If Applicable):  No results found for: LABABO, LABRH    Drug/Infectious Status (If Applicable):  No results found for: HIV, HEPCAB    COVID-19 Screening (If Applicable):   Lab Results   Component Value Date/Time    COVID19 Not Detected 02/07/2022 12:00 AM    COVID19 Performed 02/07/2022 12:00 AM           Anesthesia Evaluation  Patient summary reviewed and Nursing notes reviewed  Airway: Mallampati: II  TM distance: >3 FB   Neck ROM: full  Mouth opening: > = 3 FB   Dental: normal exam         Pulmonary:Negative Pulmonary ROS breath sounds clear to auscultation                             Cardiovascular:Negative CV ROS  Exercise tolerance: good (>4 METS),   (+) hypertension: mild,         Rhythm: regular  Rate: normal                    Neuro/Psych:   Negative Neuro/Psych ROS              GI/Hepatic/Renal:   (+) GERD: well controlled,           Endo/Other: Negative Endo/Other ROS   (+) : arthritis: OA., .                 Abdominal:             Vascular: negative vascular ROS.          Other Findings: Anesthesia Plan      spinal     ASA 2             Anesthetic plan and risks discussed with patient.               Post-op pain plan if not by surgeon: single peripheral nerve block            Lisbeth Finley MD   2/17/2023

## 2023-02-17 NOTE — PROGRESS NOTES
ACUTE PHYSICAL THERAPY GOALS:   (Developed with and agreed upon by patient and/or caregiver.)  GOALS (1-4 days):  (1.)Mr. Jhoan Abdul will move from supine to sit and sit to supine  in bed with SUPERVISION. (2.)Mr. Jhoan Abdul will transfer from bed to chair and chair to bed with SUPERVISION using the least restrictive device. (3.)Mr. Jhoan Abdul will ambulate with SUPERVISION for 450 feet with the least restrictive device. (4.)Mr. Jhoan Abdul will ambulate up/down 2 steps with bilateral  railing with CONTACT GUARD ASSIST.met  (5.)Mr. Jhoan Abdul will increase left knee ROM to 0-90°. met  ________________________________________________________________________________________________        PHYSICAL THERAPY JOINT CAMP: TOTAL KNEE ARTHROPLASTY Initial Assessment and PM  (Link to Caseload Tracking: PT Visit Days : 1  Acknowledge Orders  Time In/Out  PT Charge Capture  Rehab Caseload Tracker  Episode   Ronald Oviedo is a 78 y.o. male   PRIMARY DIAGNOSIS: Status post left knee replacement  Primary osteoarthritis of left knee [M17.12]  Procedure(s) (LRB):  lt KNEE TOTAL ARTHROPLASTY ROBOTIC/ SDD (Left)  Day of Surgery  Reason for Referral: Pain in Left Knee (M25.562)  Stiffness of Left Knee, Not elsewhere classified (M25.662)  Difficulty in walking, Not elsewhere classified (R26.2)  Outpatient in a bed: Payor: MEDICARE / Plan: MEDICARE PART A AND B / Product Type: *No Product type* /     REHAB RECOMMENDATIONS:   Recommendation to date pending progress:  Setting:  Home Health Therapy    Equipment:     Has equipment     RANGE OF MOTION:   Left Knee Flexion: L Knee Flexion (0-145): 90  Left Knee Extension: L Knee Extension (0): 0     GAIT: I Mod I S SBA CGA Min Mod Max Total  NT x2 Comments:   Level of Assistance [] [] [] [x] [] [] [] [] [] [] []            Weightbearing Status  Left Lower Extremity Weight Bearing: Weight Bearing As Tolerated    Distance  450 feet    Gait Quality Decreased lino  and Decreased stance    DME Rolling Walker Stairs Stairs/Curb  Stairs?: Yes  Stairs  # Steps : 3  Rails: Bilateral  Assistance: Stand by assistance    Ramp     I=Independent, Mod I=Modified Independent, S=Supervision, SBA=Standby Assistance, CGA=Contact Guard Assistance,   Min=Minimal Assistance, Mod=Moderate Assistance, Max=Maximal Assistance, Total=Total Assistance, NT=Not Tested    ASSESSMENT:   ASSESSMENT:  Mr. Lila Yadav presents with decreased strength and range of motion left lower extremity and with decreased independence with functional mobility s/p left total knee arthroplasty. Pt will benefit from skilled PT interventions to maximize independence with functional mobility and TKA management. Pt did well with assessment and functional mobility. Wife present. Reviewed safety and importance of rom and swelling control and that he will have more pain and swelling at home. Answered questions. Today's treatment focused on transfer and gait training and steps and did well. Pt practiced TKA exercises as below with verbal cues while reviewing HEP. Reviewed use of cryocuff as needed for pain and swelling. Pt instructed not to get up without assist. Pt plans to discharge to home today from the hospital with continued therapy for follow up.       Outcome Measure:   KOOS-JR:  KOOS, Jr. Knee Survey Score: 14    SUBJECTIVE:   Mr. Lila Yadav states, \"good\"     Home Environment/Prior Level of Function Lives With: Spouse  Type of Home: House  Home Layout: Two level, Able to Live on Main level with bedroom/bathroom (16 steps to second level with 1 rail)  Home Access: Stairs to enter without rails  Entrance Stairs - Number of Steps: 2  Bathroom Shower/Tub: Walk-in shower  Bathroom Toilet: Standard  Bathroom Equipment: Shower chair, 3-in-1 commode  Home Equipment: Cane  ADL Assistance: Independent  Homemaking Assistance: Independent  Ambulation Assistance: Independent  Transfer Assistance: Independent  Mode of Transportation: Car  Occupation: Retired    OBJECTIVE: PAIN: VITAL SIGNS: LINES/DRAINS:   Pre Treatment:         Post Treatment: 0 Vitals        Oxygen    None    RESTRICTIONS/PRECAUTIONS:  Restrictions/Precautions: Weight Bearing  Left Lower Extremity Weight Bearing: Weight Bearing As Tolerated        Restrictions/Precautions: Weight Bearing        LOWER EXTREMITY GROSS EVALUATION:  RIGHT LE   Within Functional Limits   Abnormal   Comments   Strength [x] []     Range of Motion [x] []        LEFT LE Within Functional Limits   Abnormal   Comments   Strength [] []  Limited from surgery   Range of Motion [] [] AROM LLE (degrees)  LLE AROM : Exceptions  L Knee Flexion (0-145): 90  L Knee Extension (0): 0     UPPER EXTREMITY GROSS EVALUATION:     Within Functional Limits   Abnormal   Comments   Strength [] []    Range of Motion [] []      SENSATION  Sensation [x]       COGNITION/  PERCEPTION: Intact Impaired (Comments):   Orientation [x]     Vision [x]     Hearing [x]     Cognition  [x]       MOBILITY: I Mod I S SBA CGA Min Mod Max Total  NT x2 Comments:   Bed Mobility    Rolling [] [] [] [] [] [] [] [] [] [] []    Supine to Sit [] [] [] [x] [] [] [] [] [] [] []    Scooting [] [] [] [x] [] [] [] [] [] [] []    Sit to Supine [] [] [] [] [] [] [] [] [] [] []    Transfers    Sit to Stand [] [] [] [x] [] [] [] [] [] [] []    Bed to Chair [] [] [] [x] [] [] [] [] [] [] []    Stand to Sit [] [] [] [x] [] [] [] [] [] [] []    Stand Pivot [] [] [] [] [] [] [] [] [] [] []    Toilet [] [] [] [] [] [] [] [] [] [] []     [] [] [] [] [] [] [] [] [] [] []    I=Independent, Mod I=Modified Independent, S=Supervision, SBA=Standby Assistance, CGA=Contact Guard Assistance,   Min=Minimal Assistance, Mod=Moderate Assistance, Max=Maximal Assistance, Total=Total Assistance, NT=Not Tested    BALANCE: Good Fair+ Fair Fair- Poor NT Comments   Sitting Static [x] [] [] [] [] []    Sitting Dynamic [x] [] [] [] [] []              Standing Static [x] [] [] [] [] [] With RW   Standing Dynamic [] [x] [] [] [] [] With RW     PLAN:   FREQUENCY AND DURATION: BID for duration of hospital stay or until stated goals are met, whichever comes first.    THERAPY PROGNOSIS: Good    PROBLEM LIST:   (Skilled intervention is medically necessary to address:)  Decreased ADL/Functional Activities, Decreased Activity Tolerance, Decreased AROM/PROM, Decreased Gait Ability, Decreased Strength, Decreased Transfer Abilities, and Increased Pain   INTERVENTIONS PLANNED:   (Benefits and precautions of physical therapy have been discussed with the patient.)  Therapeutic Activity, Therapeutic Exercise/HEP, Gait Training, Modalities, and Education       TREATMENT:   EVALUATION: LOW COMPLEXITY: (Untimed Charge)    TREATMENT:   Therapeutic Activity (45 Minutes): Therapeutic activity included Supine to Sit, Scooting, Transfer Training, Ambulation on level ground, Stair Training, Sitting balance , Standing balance, and exercises to improve functional Activity tolerance, Balance, Coordination, Mobility, Strength, and ROM.     TREATMENT GRID:  THERAPEUTIC  EXERCISES: DATE:  2/17 DATE:   DATE:      AM PM AM PM AM PM    [] [] [] [] [] []   Ankle Pumps  10       Quad Sets  10       Gluteal Sets  10       Hip Abd/ADduction  10       Straight Leg Raises  10       Knee Slides  10       Short Arc Quads  10       Chair Slides  10                         B = bilateral; AA = active assistive; A = active; P = passive      EDUCATION: Education Given To: Patient, Family  Education Provided: Role of Therapy, Home Exercise Program, Transfer Training  Education Method: Demonstration, Verbal  Education Outcome: Verbalized understanding, Demonstrated understanding  EDUCATION:  [x] Home Exercises  [x] Fall Precautions  [x] No Pillow Under Knee  [x] D/C Instruction Review [x] Cryocuff  [x] Walker Management/Safety  [] Adaptive Equipment as Needed     AFTER TREATMENT PRECAUTIONS: Bed/Chair Locked, Call light within reach, Chair, Needs within reach, RN notified, and Visitors at bedside    INTERDISCIPLINARY COLLABORATION:  RN/ PCT and OT/ WILHELM    COMPLIANCE WITH PROGRAM/EXERCISE: compliant most of the time. RECOMMENDATIONS/INTENT FOR NEXT TREATMENT SESSION: Treatment next visit will focus on increasing Mr. Delgado Public independence with bed mobility, transfers, gait training, strength/ROM exercises, modalities for pain, and patient education.      TIME IN/OUT:  Time In: 1320  Time Out: 99 Jadiel Villanueva  Minutes: [address] [facility]

## 2023-02-17 NOTE — ANESTHESIA POSTPROCEDURE EVALUATION
Department of Anesthesiology  Postprocedure Note    Patient: Jaguar Adams  MRN: [id number]  YOB: 1945  Date of evaluation: 2/17/2023      Procedure Summary     Date: 02/17/23 Room / Location: [facility] 01 / [facility]    Anesthesia Start: 0758 Anesthesia Stop: 1031    Procedure: lt KNEE TOTAL ARTHROPLASTY ROBOTIC/ SDD (Left: Knee) Diagnosis:       Primary osteoarthritis of left knee      (Primary osteoarthritis of left knee [M17.12])    Surgeons: Naseem Martini Jr., MD Responsible Provider: Abdirahman Hilton Jr., MD    Anesthesia Type: spinal ASA Status: 2          Anesthesia Type: No value filed.    Smion Phase I: Simno Score: 8    Simon Phase II:        Anesthesia Post Evaluation    Patient location during evaluation: PACU  Patient participation: complete - patient participated  Level of consciousness: awake  Pain score: 0  Airway patency: patent  Nausea & Vomiting: no nausea and no vomiting  Complications: no  Cardiovascular status: blood pressure returned to baseline and hemodynamically stable  Respiratory status: acceptable, spontaneous ventilation and nonlabored ventilation  Hydration status: euvolemic  Multimodal analgesia pain management approach

## 2023-02-17 NOTE — CARE COORDINATION
02/17/23 1515   Service Assessment   Patient Orientation Alert and Oriented   Cognition Alert   History Provided By Patient   Primary Caregiver Self   PCP Verified by CM Yes   Prior Functional Level Independent in ADLs/IADLs   Current Functional Level Independent in ADLs/IADLs   Can patient return to prior living arrangement Yes   Family able to assist with home care needs: Yes   Would you like for me to discuss the discharge plan with any other family members/significant others, and if so, who? No     Patient has a rolling walker and will be followed by Carlos Foy for outpatient therapy.

## 2023-02-17 NOTE — PROGRESS NOTES
Received to room from PACU. Left knee Aquacel clean, dry, and intact. Plantar/dorsiflexion absent, pulses 2+, numbness in bilateral lower extremities. Ice applied to left knee. Discussed diet and pain control. Oriented to room and bed functions. Bed locked, in lowest position, call light within reach.

## 2023-02-20 ENCOUNTER — TELEPHONE (OUTPATIENT)
Dept: ORTHOPEDIC SURGERY | Age: 78
End: 2023-02-20

## 2023-02-20 ENCOUNTER — TELEPHONE (OUTPATIENT)
Dept: ORTHOPEDICS UNIT | Age: 78
End: 2023-02-20

## 2023-02-20 DIAGNOSIS — Z96.652 STATUS POST LEFT KNEE REPLACEMENT: ICD-10-CM

## 2023-02-20 NOTE — TELEPHONE ENCOUNTER
Called to follow up after TKA with SDD on 2/17/23. No answer. VM left with contact number for ortho navigator.

## 2023-02-21 RX ORDER — OXYCODONE HYDROCHLORIDE 5 MG/1
5 TABLET ORAL EVERY 4 HOURS PRN
Qty: 30 TABLET | Refills: 0 | Status: SHIPPED | OUTPATIENT
Start: 2023-02-21 | End: 2023-02-26

## 2023-03-16 ENCOUNTER — TELEPHONE (OUTPATIENT)
Dept: ORTHOPEDIC SURGERY | Age: 78
End: 2023-03-16

## 2023-03-16 DIAGNOSIS — Z96.652 STATUS POST LEFT KNEE REPLACEMENT: Primary | ICD-10-CM

## 2023-03-16 NOTE — TELEPHONE ENCOUNTER
He is being discharged from home health. He wants to attend [facility]. Their fax # [phone]. Can you please send an order.

## 2023-03-30 ENCOUNTER — OFFICE VISIT (OUTPATIENT)
Dept: ORTHOPEDIC SURGERY | Age: 78
End: 2023-03-30

## 2023-03-30 DIAGNOSIS — Z96.652 STATUS POST LEFT KNEE REPLACEMENT: Primary | ICD-10-CM

## 2023-03-30 NOTE — PROGRESS NOTES
will continue physical therapy exercises. The patient was advised about fall precautions and dental prophylaxis. The patient will follow up as scheduled in 5 or sooner if needed.

## 2023-05-01 NOTE — PROGRESS NOTES
Latest Reference Range & Units 7/26/22 08:03   Sodium 136 - 145 mmol/L 141   Potassium 3.5 - 5.1 mmol/L 4.1   Chloride 98 - 107 mmol/L 106   CO2 21 - 32 mmol/L 30   BUN,BUNPL 8 - 23 MG/DL 17   Creatinine 0.8 - 1.5 MG/DL 0.96   Anion Gap 7 - 16 mmol/L 5 (L)   GFR Non-African American >60 ml/min/1.73m2 >60   GFR  >60 ml/min/1.73m2 >60   GLUCOSE, FASTING,GF 65 - 100 mg/dL 102 (H)   CALCIUM, SERUM, 967146 8.3 - 10.4 MG/DL 8.3   Hemoglobin A1C 4.8 - 5.6 % 5.6   eAG (mg/dL) mg/dL 114   WBC 4.3 - 11.1 K/uL 4.2 (L)   RBC 4.23 - 5.6 M/uL 4.13 (L)   Hemoglobin Quant 13.6 - 17.2 g/dL 13.0 (L)   Hematocrit 41.1 - 50.3 % 40.1 (L)   MCV 79.6 - 97.8 FL 97.1   MCH 26.1 - 32.9 PG 31.5   MCHC 31.4 - 35.0 g/dL 32.4   MPV 9.4 - 12.3 FL 9.3 (L)   RDW 11.9 - 14.6 % 12.9   Platelet Count 861 - 450 K/uL 242   Absolute Mono # 0.1 - 1.3 K/UL 0.4   Eosinophils % 0.5 - 7.8 % 5   Basophils Absolute 0.0 - 0.2 K/UL 0.0   Differential Type -   AUTOMATED   Seg Neutrophils 43 - 78 % 63   Segs Absolute 1.7 - 8.2 K/UL 2.6   Lymphocytes 13 - 44 % 21   Absolute Lymph # 0.5 - 4.6 K/UL 0.9   Monocytes 4.0 - 12.0 % 10   Absolute Eos # 0.0 - 0.8 K/UL 0.2   Basophils 0.0 - 2.0 % 1   Immature Granulocytes 0.0 - 5.0 % 1   Nucleated Red Blood Cells 0.0 - 0.2 K/uL 0.00   Absolute Immature Granulocyte 0.0 - 0.5 K/UL 0.0   Prothrombin Time 12.6 - 14.5 sec 13.9   INR -   1.0   PTT 24.1 - 35.1 SEC 28.6   (L): Data is abnormally low  (H): Data is abnormally high Low Sodium Diet (2,000 Milligram): Care Instructions  Overview     Limiting sodium can be an important part of managing some health problems. The most common source of sodium is salt. People get most of the salt in their diet from canned, prepared, and packaged foods. Fast food and restaurant meals also are very high in sodium. Your doctor will probably limit your sodium to less than 2,000 milligrams (mg) a day. This limit counts all the sodium in prepared and packaged foods and any salt you add to your food. Follow-up care is a key part of your treatment and safety. Be sure to make and go to all appointments, and call your doctor if you are having problems. It's also a good idea to know your test results and keep a list of the medicines you take. How can you care for yourself at home? Read food labels  Read labels on cans and food packages. The labels tell you how much sodium is in each serving. Make sure that you look at the serving size. If you eat more than the serving size, you have eaten more sodium. Food labels also tell you the Percent Daily Value for sodium. Choose products with low Percent Daily Values for sodium. Be aware that sodium can come in forms other than salt, including monosodium glutamate (MSG), sodium citrate, and sodium bicarbonate (baking soda). MSG is often added to Asian food. When you eat out, you can sometimes ask for food without MSG or added salt. Buy low-sodium foods  Buy foods that are labeled \"unsalted\" (no salt added), \"sodium-free\" (less than 5 mg of sodium per serving), or \"low-sodium\" (140 mg or less of sodium per serving). Foods labeled \"reduced-sodium\" and \"light sodium\" may still have too much sodium. Be sure to read the label to see how much sodium you are getting. Buy fresh vegetables, or frozen vegetables without added sauces. Buy low-sodium versions of canned vegetables, soups, and other canned goods.   Prepare low-sodium meals  Cut back on the amount of salt you use in cooking. This will help you adjust to the taste. Do not add salt after cooking. One teaspoon of salt has about 2,300 mg of sodium. Take the salt shaker off the table. Flavor your food with garlic, lemon juice, onion, vinegar, herbs, and spices. Do not use soy sauce, lite soy sauce, steak sauce, onion salt, garlic salt, celery salt, or ketchup on your food. Use low-sodium salad dressings, sauces, and ketchup. Or make your own salad dressings and sauces without adding salt. Use less salt (or none) when recipes call for it. You can often use half the salt a recipe calls for without losing flavor. Other foods such as rice, pasta, and grains do not need added salt. Rinse canned vegetables, and cook them in fresh water. This removes some--but not all--of the salt. Avoid water that is naturally high in sodium or that has been treated with water softeners, which add sodium. If you buy bottled water, read the label and choose a sodium-free brand. Avoid high-sodium foods  Avoid eating:  Smoked, cured, salted, and canned meat, fish, and poultry. Ham, lowe, hot dogs, and luncheon meats. Regular, hard, and processed cheese and regular peanut butter. Crackers with salted tops, and other salted snack foods such as pretzels, chips, and salted popcorn. Frozen prepared meals, unless labeled low-sodium. Canned and dried soups, broths, and bouillon, unless labeled sodium-free or low-sodium. Canned vegetables, unless labeled sodium-free or low-sodium. Western Rach fries, pizza, tacos, and other fast foods. Pickles, olives, ketchup, and other condiments, especially soy sauce, unless labeled sodium-free or low-sodium. Where can you learn more? Go to http://www.gray.com/  Enter V843 in the search box to learn more about \"Low Sodium Diet (2,000 Milligram): Care Instructions. \"  Current as of: May 9, 2022               Content Version: 13.6  © 0332-2988 [employer]wise, [facility].    Care instructions adapted under license by [employer] (which disclaims liability or warranty for this information). If you have questions about a medical condition or this instruction, always ask your healthcare professional. [address] any warranty or liability for your use of this information.

## 2023-08-31 ENCOUNTER — OFFICE VISIT (OUTPATIENT)
Dept: ORTHOPEDIC SURGERY | Age: 78
End: 2023-08-31

## 2023-08-31 DIAGNOSIS — Z96.652 STATUS POST LEFT KNEE REPLACEMENT: Primary | ICD-10-CM

## 2023-08-31 DIAGNOSIS — Z96.651 PRESENCE OF RIGHT ARTIFICIAL KNEE JOINT: ICD-10-CM

## 2023-08-31 NOTE — PROGRESS NOTES
Post-op TKA Visit      08/31/23     No Known Allergies  Current Outpatient Medications on File Prior to Visit   Medication Sig Dispense Refill    acetaminophen (TYLENOL) 500 MG tablet Take 2 tablets by mouth every 6 hours as needed for Pain 120 tablet 3    aspirin EC 81 MG EC tablet Take 1 tablet by mouth in the morning and 1 tablet in the evening. 70 tablet 0    promethazine (PHENERGAN) 12.5 MG tablet Take 1 tablet by mouth 4 times daily as needed for Nausea 20 tablet 2    lisinopril (PRINIVIL;ZESTRIL) 5 MG tablet Take 5 mg by mouth at bedtime      meloxicam (MOBIC) 15 MG tablet Take 15 mg by mouth as needed for Pain      [DISCONTINUED] ibuprofen (ADVIL;MOTRIN) 200 MG tablet Take 200 mg by mouth every 6 hours as needed for Pain      simvastatin (ZOCOR) 20 MG tablet Take 20 mg by mouth nightly      tadalafil (CIALIS) 5 MG tablet Take 5 mg by mouth as needed for Erectile Dysfunction      [DISCONTINUED] glucosamine-chondroitin 750-600 MG TABS tablet Take by mouth       No current facility-administered medications on file prior to visit. Status Post staged bilateral TKA     History: The patient returns today for post-op visit following 6 staged bilateral TKA. He underwent left total knee replacement 6 months ago and right total knee replacement approximately 1 year ago. The patient has minimal pain. They are ambulating without any walking aid. They have resumed most of their normal activities. They are pleased with their results thus far. Denies any new complaints today. Physical Exam:  The patient's incision is well healed. There is minimal swelling and minimal increased warmth. ROM right knee is 0 to 125 degrees. ROM left knee is as 0 to 125 degrees. There is no M/L or A/P instability. The calves are soft and non-tender. Neurologic and vascular exams are intact. X-Rays: AP, Lateral, and sunrise views of the bilateral knee reveals a cementless TKA, Louann prosthesis.  There is a cemented patella

## 2023-11-09 ENCOUNTER — OFFICE VISIT (OUTPATIENT)
Dept: ORTHOPEDIC SURGERY | Age: 78
End: 2023-11-09

## 2023-11-09 DIAGNOSIS — M25.552 BILATERAL HIP PAIN: Primary | ICD-10-CM

## 2023-11-09 DIAGNOSIS — M25.551 BILATERAL HIP PAIN: Primary | ICD-10-CM

## 2023-11-09 NOTE — PROGRESS NOTES
were given to follow up if there is any neurologic or functional decline.  ---- A home exercise program was prescribed for stretching and strengthening. A list of exercises was provided. ---- Topical NSAID: The patient is agreeable to a trial of topical nonsteroidal anti-inflammatory drugs (NSAIDs). The patient was prescribed Diclofenac topical.  -----Referral to [facility] non-surgical spine provider for evaluation and treatment.     3--this is a stable chronic illness/condition

## 2023-11-22 DIAGNOSIS — Z96.652 STATUS POST LEFT KNEE REPLACEMENT: Primary | ICD-10-CM

## 2024-05-22 ENCOUNTER — PATIENT MESSAGE (OUTPATIENT)
Dept: ORTHOPEDIC SURGERY | Age: 79
End: 2024-05-22

## 2024-05-22 NOTE — TELEPHONE ENCOUNTER
From: Jaguar Adams  To: Dr. Sandra Apodaca  Sent: 5/21/2024 11:32 AM EDT  Subject: Appointment Request    Appointment Request From: Jaguar Adams    With Provider: SANDRA APODACA JR, MD [[facility]]    Preferred Date Range: Any    Preferred Times: Any Time    Reason for visit: Request an Appointment    Comments:  To check on left knee(which has been replaced). It has some swelling & sore

## 2024-05-24 ENCOUNTER — TELEPHONE (OUTPATIENT)
Dept: ORTHOPEDIC SURGERY | Age: 79
End: 2024-05-24

## 2024-05-24 DIAGNOSIS — Z96.652 STATUS POST LEFT KNEE REPLACEMENT: Primary | ICD-10-CM

## 2024-05-24 NOTE — TELEPHONE ENCOUNTER
Appointment Request  (Newest Message First)  View All Conversations on this Encounter  Jaguar Adams \"Jagdeep\"  [facility]  Staff12 hours ago (9:14 PM)     RD  Erendira,  Thanks for setting up the apt. with Shannon on June 7.  The left knee continues to be very problematic. I would like to rule out the possibility of an infection so would it be possible to get a blood test Friday (5/24) to get that process started in advance of the up coming apt?  Thanks for your help.  Jagdeep Adams \"Jagdeep\"  [facility]  Staff2 days ago     [facility]  Erendira,  Would it be possible to a PA sooner?  Thanks,   Jagdeep Adams       You2 days ago     GE  From: Jaguar Adams  To: Dr. Sandra Apodaca  Sent: 5/21/2024 11:32 AM EDT  Subject: Appointment Request    Appointment Request From: Jaguar Adams    With Provider: SANDRA APODACA JR, MD [[facility]]    Preferred Date Range: Any    Preferred Times: Any Time    Reason for visit: Request an Appointment    Comments:  To check on left knee(which has been replaced). It has some swelling & sore         Note        You  Jaguar Adams \"Jagdeep\"2 days ago     [facility] [place] Dr. Lianet Gannon is scheduling on August 29th at this time. Would you like to schedule on this day?          Erendira, Referral Specialist-Appointment Scheduling     [facility]     [phone]        Jaguar Adams \"Jagdeep\"  [facility]  Staff3 days ago     RD  Appointment Request From: Jaguar Adams     With Provider: SANDRA APODACA JR, MD [[facility]]     Preferred Date Range: Any     Preferred Times: Any Time     Reason for visit: Request an Appointment     Comments:  To check on left knee(which has been replaced). It has some swelling & sore

## 2024-06-07 ENCOUNTER — OFFICE VISIT (OUTPATIENT)
Dept: ORTHOPEDIC SURGERY | Age: 79
End: 2024-06-07
Payer: MEDICARE

## 2024-06-07 DIAGNOSIS — Z96.652 STATUS POST LEFT KNEE REPLACEMENT: Primary | ICD-10-CM

## 2024-06-07 PROCEDURE — G8428 CUR MEDS NOT DOCUMENT: HCPCS | Performed by: PHYSICIAN ASSISTANT

## 2024-06-07 PROCEDURE — G8421 BMI NOT CALCULATED: HCPCS | Performed by: PHYSICIAN ASSISTANT

## 2024-06-07 PROCEDURE — 99213 OFFICE O/P EST LOW 20 MIN: CPT | Performed by: PHYSICIAN ASSISTANT

## 2024-06-07 PROCEDURE — 1123F ACP DISCUSS/DSCN MKR DOCD: CPT | Performed by: PHYSICIAN ASSISTANT

## 2024-06-07 PROCEDURE — 1036F TOBACCO NON-USER: CPT | Performed by: PHYSICIAN ASSISTANT

## 2024-06-07 NOTE — PROGRESS NOTES
Name: Jaguar Adams  YOB: 1945  Gender: male  MRN: [id number]      Current Outpatient Medications:     acetaminophen (TYLENOL) 500 MG tablet, Take 2 tablets by mouth every 6 hours as needed for Pain, Disp: 120 tablet, Rfl: 3    aspirin EC 81 MG EC tablet, Take 1 tablet by mouth in the morning and 1 tablet in the evening., Disp: 70 tablet, Rfl: 0    promethazine (PHENERGAN) 12.5 MG tablet, Take 1 tablet by mouth 4 times daily as needed for Nausea, Disp: 20 tablet, Rfl: 2    lisinopril (PRINIVIL;ZESTRIL) 5 MG tablet, Take 5 mg by mouth at bedtime, Disp: , Rfl:     meloxicam (MOBIC) 15 MG tablet, Take 15 mg by mouth as needed for Pain, Disp: , Rfl:     simvastatin (ZOCOR) 20 MG tablet, Take 20 mg by mouth nightly, Disp: , Rfl:     tadalafil (CIALIS) 5 MG tablet, Take 5 mg by mouth as needed for Erectile Dysfunction, Disp: , Rfl:   No Known Allergies    status post left TKA    HPI: The patient had a left TKA performed 2/17/23.  Patient states he had an episode of acute onset swelling and increased pain/tightness in the knee and shin with no known injury or falls.  He was seen at one of our urgent care offices for this and was given naproxen which has helped tremendously.  He is here today for follow-up.  He states his symptoms have improved.  He denies any catching, locking, instability, redness, warmth, fever, chills.  The patient is still pleased with the results.  They report occasional soreness, but otherwise, they have no other new complaints.    PMH: Reviewed and unchanged    ROS:  As per HPI; pertinent positives and negatives are addressed with the patient, particularly, those related to musculoskeletal concerns.  Non-orthopaedic concerns were referred back to the primary care physician.    PE:  left Knee  Patient is comfortable and in no distress.  ROM: 0 to 120 degrees  AP translation: 4 mm  M-L laxity: 2 degrees  Alignment: 4 degrees of valgus  Quadriceps strength: excellent  Gait: no

## 2024-10-03 ENCOUNTER — PATIENT MESSAGE (OUTPATIENT)
Dept: ORTHOPEDIC SURGERY | Age: 79
End: 2024-10-03

## 2024-10-08 NOTE — PROGRESS NOTES
Name: Jaguar Adams  YOB: 1945  Gender: male  MRN: [id number]      Current Outpatient Medications:     acetaminophen (TYLENOL) 500 MG tablet, Take 2 tablets by mouth every 6 hours as needed for Pain, Disp: 120 tablet, Rfl: 3    aspirin EC 81 MG EC tablet, Take 1 tablet by mouth in the morning and 1 tablet in the evening., Disp: 70 tablet, Rfl: 0    promethazine (PHENERGAN) 12.5 MG tablet, Take 1 tablet by mouth 4 times daily as needed for Nausea, Disp: 20 tablet, Rfl: 2    lisinopril (PRINIVIL;ZESTRIL) 5 MG tablet, Take 5 mg by mouth at bedtime, Disp: , Rfl:     meloxicam (MOBIC) 15 MG tablet, Take 15 mg by mouth as needed for Pain, Disp: , Rfl:     simvastatin (ZOCOR) 20 MG tablet, Take 20 mg by mouth nightly, Disp: , Rfl:     tadalafil (CIALIS) 5 MG tablet, Take 5 mg by mouth as needed for Erectile Dysfunction, Disp: , Rfl:   No Known Allergies    status post left TKA    HPI: The patient had a left TKA performed 2/17/23.      06/07/24: Patient states he had an episode of acute onset swelling and increased pain/tightness in the knee and shin with no known injury or falls.  He was seen at one of our urgent care offices for this and was given naproxen which has helped tremendously.  He is here today for follow-up.  He states his symptoms have improved.  He denies any catching, locking, instability, redness, warmth, fever, chills.  The patient is still pleased with the results.  They report occasional soreness, but otherwise, they have no other new complaints.    10/09/24: Patient returns for follow up evaluation of left knee pain as his pain has returned.  He states he is hurting along the lateral knee to the mid shin.  He denies any groin or lower back pain.  He denies any new falls or injuries.  He denies any redness, warmth, swelling, fever, chills.  He denies any loss of range of motion.      PMH: Reviewed and unchanged    ROS:  As per HPI; pertinent positives and negatives are addressed with the

## 2024-10-09 ENCOUNTER — OFFICE VISIT (OUTPATIENT)
Dept: ORTHOPEDIC SURGERY | Age: 79
End: 2024-10-09
Payer: MEDICARE

## 2024-10-09 DIAGNOSIS — Z96.652 STATUS POST LEFT KNEE REPLACEMENT: ICD-10-CM

## 2024-10-09 DIAGNOSIS — Z96.652 STATUS POST LEFT KNEE REPLACEMENT: Primary | ICD-10-CM

## 2024-10-09 LAB
BASOPHILS # BLD: 0.1 K/UL (ref 0–0.2)
BASOPHILS NFR BLD: 1 % (ref 0–2)
DIFFERENTIAL METHOD BLD: NORMAL
EOSINOPHIL # BLD: 0.1 K/UL (ref 0–0.8)
EOSINOPHIL NFR BLD: 2 % (ref 0.5–7.8)
ERYTHROCYTE [DISTWIDTH] IN BLOOD BY AUTOMATED COUNT: 13.1 % (ref 11.9–14.6)
ERYTHROCYTE [SEDIMENTATION RATE] IN BLOOD: 3 MM/HR
HCT VFR BLD AUTO: 43.4 % (ref 41.1–50.3)
HGB BLD-MCNC: 13.9 G/DL (ref 13.6–17.2)
IMM GRANULOCYTES # BLD AUTO: 0 K/UL (ref 0–0.5)
IMM GRANULOCYTES NFR BLD AUTO: 0 % (ref 0–5)
LYMPHOCYTES # BLD: 1.1 K/UL (ref 0.5–4.6)
LYMPHOCYTES NFR BLD: 22 % (ref 13–44)
MCH RBC QN AUTO: 32 PG (ref 26.1–32.9)
MCHC RBC AUTO-ENTMCNC: 32 G/DL (ref 31.4–35)
MCV RBC AUTO: 100 FL (ref 82–102)
MONOCYTES # BLD: 0.6 K/UL (ref 0.1–1.3)
MONOCYTES NFR BLD: 12 % (ref 4–12)
NEUTS SEG # BLD: 3.2 K/UL (ref 1.7–8.2)
NEUTS SEG NFR BLD: 63 % (ref 43–78)
NRBC # BLD: 0 K/UL (ref 0–0.2)
PLATELET # BLD AUTO: 250 K/UL (ref 150–450)
PMV BLD AUTO: 9.5 FL (ref 9.4–12.3)
RBC # BLD AUTO: 4.34 M/UL (ref 4.23–5.6)
WBC # BLD AUTO: 5.2 K/UL (ref 4.3–11.1)

## 2024-10-09 PROCEDURE — G8421 BMI NOT CALCULATED: HCPCS | Performed by: PHYSICIAN ASSISTANT

## 2024-10-09 PROCEDURE — G8428 CUR MEDS NOT DOCUMENT: HCPCS | Performed by: PHYSICIAN ASSISTANT

## 2024-10-09 PROCEDURE — 99214 OFFICE O/P EST MOD 30 MIN: CPT | Performed by: PHYSICIAN ASSISTANT

## 2024-10-09 PROCEDURE — 1036F TOBACCO NON-USER: CPT | Performed by: PHYSICIAN ASSISTANT

## 2024-10-09 PROCEDURE — G8484 FLU IMMUNIZE NO ADMIN: HCPCS | Performed by: PHYSICIAN ASSISTANT

## 2024-10-09 PROCEDURE — 1123F ACP DISCUSS/DSCN MKR DOCD: CPT | Performed by: PHYSICIAN ASSISTANT

## 2024-10-11 LAB — CRP SERPL-MCNC: <1 MG/L (ref 0–10)

## 2024-10-15 ENCOUNTER — OFFICE VISIT (OUTPATIENT)
Dept: ORTHOPEDIC SURGERY | Age: 79
End: 2024-10-15
Payer: MEDICARE

## 2024-10-15 DIAGNOSIS — Z96.652 STATUS POST LEFT KNEE REPLACEMENT: Primary | ICD-10-CM

## 2024-10-15 PROCEDURE — G8484 FLU IMMUNIZE NO ADMIN: HCPCS | Performed by: ORTHOPAEDIC SURGERY

## 2024-10-15 PROCEDURE — G8428 CUR MEDS NOT DOCUMENT: HCPCS | Performed by: ORTHOPAEDIC SURGERY

## 2024-10-15 PROCEDURE — 1036F TOBACCO NON-USER: CPT | Performed by: ORTHOPAEDIC SURGERY

## 2024-10-15 PROCEDURE — 99214 OFFICE O/P EST MOD 30 MIN: CPT | Performed by: ORTHOPAEDIC SURGERY

## 2024-10-15 PROCEDURE — 1123F ACP DISCUSS/DSCN MKR DOCD: CPT | Performed by: ORTHOPAEDIC SURGERY

## 2024-10-15 PROCEDURE — G8421 BMI NOT CALCULATED: HCPCS | Performed by: ORTHOPAEDIC SURGERY

## 2024-10-15 NOTE — PROGRESS NOTES
Name: Jaguar Adams  YOB: 1945  Gender: male  MRN: [id number]      Current Outpatient Medications:     acetaminophen (TYLENOL) 500 MG tablet, Take 2 tablets by mouth every 6 hours as needed for Pain, Disp: 120 tablet, Rfl: 3    aspirin EC 81 MG EC tablet, Take 1 tablet by mouth in the morning and 1 tablet in the evening., Disp: 70 tablet, Rfl: 0    promethazine (PHENERGAN) 12.5 MG tablet, Take 1 tablet by mouth 4 times daily as needed for Nausea, Disp: 20 tablet, Rfl: 2    lisinopril (PRINIVIL;ZESTRIL) 5 MG tablet, Take 5 mg by mouth at bedtime, Disp: , Rfl:     meloxicam (MOBIC) 15 MG tablet, Take 15 mg by mouth as needed for Pain, Disp: , Rfl:     simvastatin (ZOCOR) 20 MG tablet, Take 20 mg by mouth nightly, Disp: , Rfl:     tadalafil (CIALIS) 5 MG tablet, Take 5 mg by mouth as needed for Erectile Dysfunction, Disp: , Rfl:   No Known Allergies    status post left TKA    HPI: The patient had a left TKA performed 2/17/23.      06/07/24: Patient states he had an episode of acute onset swelling and increased pain/tightness in the knee and shin with no known injury or falls.  He was seen at one of our urgent care offices for this and was given naproxen which has helped tremendously.  He is here today for follow-up.  He states his symptoms have improved.  He denies any catching, locking, instability, redness, warmth, fever, chills.  The patient is still pleased with the results.  They report occasional soreness, but otherwise, they have no other new complaints.    10/09/24: Patient returns for follow up evaluation of left knee pain as his pain has returned.  He states he is hurting along the lateral knee to the mid shin.  He denies any groin or lower back pain.  He denies any new falls or injuries.  He denies any redness, warmth, swelling, fever, chills.  He denies any loss of range of motion.    10/15/24: Patient returns today to follow-up after infectious workup.  His CBC, CRP and ESR were all normal.

## 2025-05-15 ENCOUNTER — OFFICE VISIT (OUTPATIENT)
Dept: ORTHOPEDIC SURGERY | Age: 80
End: 2025-05-15
Payer: MEDICARE

## 2025-05-15 DIAGNOSIS — Z96.652 STATUS POST LEFT KNEE REPLACEMENT: Primary | ICD-10-CM

## 2025-05-15 DIAGNOSIS — M79.18 BUTTOCK PAIN: ICD-10-CM

## 2025-05-15 PROCEDURE — G8428 CUR MEDS NOT DOCUMENT: HCPCS | Performed by: ORTHOPAEDIC SURGERY

## 2025-05-15 PROCEDURE — G8421 BMI NOT CALCULATED: HCPCS | Performed by: ORTHOPAEDIC SURGERY

## 2025-05-15 PROCEDURE — 99214 OFFICE O/P EST MOD 30 MIN: CPT | Performed by: ORTHOPAEDIC SURGERY

## 2025-05-15 PROCEDURE — 4004F PT TOBACCO SCREEN RCVD TLK: CPT | Performed by: ORTHOPAEDIC SURGERY

## 2025-05-15 PROCEDURE — 1123F ACP DISCUSS/DSCN MKR DOCD: CPT | Performed by: ORTHOPAEDIC SURGERY

## 2025-05-15 NOTE — PROGRESS NOTES
Name: Jaguar Adams  YOB: 1945  Gender: male  MRN: [id number]    CC:   Chief Complaint   Patient presents with    Knee Pain     Left tka          HPI: Jaguar Adams is a 79 y.o. male with a  has a past medical history of Arthritis, COVID-19, Erectile dysfunction, GERD (gastroesophageal reflux disease), Hypertension, Inguinal hernia, Mixed hyperlipidemia, Palpitations, Personal history of malignant neoplasm of prostate, Prostate cancer (HCC), and Skin cancer, basal cell. here for evaluation of left leg pain s/p L TKA in 2023  There was not an acute injury and was not present after the surgery until about a year ago.  The pain has been present for 1 year and remains about the same.. The pain is primarily is primarily behind his knee over the lateral aspect of the knee with some radiation down the anterior lateral calf.  he describes the pain as aching  The pain is worse with sitting and is made worse with change of position such as getting up to walk.  The pain does radiate down the leg.  he denies numbness and tingling down the leg.   Treatment so far has been activity modification with relief.        Review of Systems  As per HPI.  Pertinent positives and negatives are addressed with the patient, particularly those related to musculoskeletal concerns.  Non-orthopaedic concerns were referred back to the primary care physician.      No Known Allergies                 PHYSICAL EXAMINATION:   The patient is alert and oriented, in no distress.  There were no vitals filed for this visit.       The gait is noted to be normal  There no tenderness to palpation over the left greater trochanter  left hip range of motion is 0-90 degrees of flexion and 20 degrees on abduction and adduction.  There is 15 degrees internal rotation and 25 degrees of external rotation without groin pain  Limb lengths are equal.  There is no tenderness to palpation over the joint line of the left knee(s)  Range of motion is 0-120

## 2025-05-30 ENCOUNTER — OFFICE VISIT (OUTPATIENT)
Dept: ORTHOPEDIC SURGERY | Age: 80
End: 2025-05-30

## 2025-05-30 VITALS — BODY MASS INDEX: 27.99 KG/M2 | HEIGHT: 69 IN | WEIGHT: 189 LBS

## 2025-05-30 DIAGNOSIS — M51.361 DEGENERATION OF INTERVERTEBRAL DISC OF LUMBAR REGION WITH LOWER EXTREMITY PAIN: Primary | ICD-10-CM

## 2025-05-30 DIAGNOSIS — M47.816 LUMBAR SPONDYLOSIS: ICD-10-CM

## 2025-05-30 NOTE — PROGRESS NOTES
Name: Jaguar Adams  YOB: 1945  Gender: male  MRN: [id number]    CC:   Chief Complaint   Patient presents with    New Patient     Lumbar spine          HPI:       History of Present Illness  The patient is a 79-year-old male who presents today for lower back pain and related problems. He recently underwent a lumbar MRI.    He reports intermittent pain, which he describes as unusual. The pain is particularly noticeable when sitting on hard surfaces or during prolonged sitting periods. It radiates from his lower back to his left knee and extends down his lower leg. He also experiences discomfort in his buttocks. He does not experience any numbness in his legs.     In early 2025, he began experiencing an unusual ache, particularly when sitting down. This discomfort limits his ability to sleep on his side to approximately 15 minutes before he needs to switch to a supine position. The onset of these symptoms was gradual, starting around January or February 2025. He recalls a specific incident where the pain became severe while sitting on a plastic folding chair at a small event, lasting approximately 20 minutes.    He has been referred to our clinic by Dr. Martini due to suspicions of a potential back issue contributing to his knee and leg pain. He is not currently on any pain medication and is not taking any blood thinners. He is not diabetic. He has previously sought treatment at [facility] for neck and back stiffness, which typically improves after the first 5 minutes of morning activity.    PAST SURGICAL HISTORY:  He underwent a successful knee replacement surgery on 02/2023, which significantly improved his mobility.          Past Medical History Includes:   Past Medical History:   Diagnosis Date    Arthritis     OA    COVID-19 07/16/2022    mild cough    Erectile dysfunction     cialis prn    GERD (gastroesophageal reflux disease)     seldom, TUMS prn- well controlled

## (undated) DEVICE — DRESSING HYDROFIBER AQUACEL AG ADVANTAGE 3.5X14 IN

## (undated) DEVICE — GLOVE SURG SZ 65 THK91MIL LTX FREE SYN POLYISOPRENE

## (undated) DEVICE — GLOVE ORANGE PI 8 1/2   MSG9085

## (undated) DEVICE — PIN BNE FIX TEMP L110MM DIA4MM MAKO

## (undated) DEVICE — FOOT & ANKLE SOFT DR WOMACK: Brand: MEDLINE INDUSTRIES, INC.

## (undated) DEVICE — KIT DRP FOR RIO ROBOTIC ARM ASST SYS

## (undated) DEVICE — YANKAUER,FLEXIBLE HANDLE,REGLR CAPACITY: Brand: MEDLINE INDUSTRIES, INC.

## (undated) DEVICE — BLADE SURG SAW STD S STL OSC W/ SERR EDGE DISP

## (undated) DEVICE — STERILE PRESSURE PROTECTOR PAD® FOR DE MAYO UNIVERSAL DISTRACTOR® (10/CASE): Brand: DE MAYO UNIVERSAL DISTRACTOR®

## (undated) DEVICE — SUT ETHLN 3-0 18IN PS2 BLK --

## (undated) DEVICE — SUTURE ABS ANTIBACT 1-0 CTX 24IN STRATAFIX PDS+ SXPP1A445

## (undated) DEVICE — STERILE PVP: Brand: MEDLINE INDUSTRIES, INC.

## (undated) DEVICE — SOLUTION IV 250ML 0.9% SOD CHL PH 5 INJ USP VIAFLX PLAS

## (undated) DEVICE — SUTURE VCRL SZ 2-0 L18IN ABSRB UD CT-1 L36MM 1/2 CIR J839D

## (undated) DEVICE — DRAPE,TOP,102X53,STERILE: Brand: MEDLINE

## (undated) DEVICE — SOLUTION IRRIG 1000ML 09% SOD CHL USP PIC PLAS CONTAINER

## (undated) DEVICE — ZIMMER® STERILE DISPOSABLE TOURNIQUET CUFF WITH PLC, DUAL PORT, SINGLE BLADDER, 18 IN. (46 CM)

## (undated) DEVICE — GUIDEPIN ORTHOPEDIC NAVIGATION 4X110 MM 2P SCREW STRL

## (undated) DEVICE — 450 ML BOTTLE OF 0.05% CHLORHEXIDINE GLUCONATE IN 99.95% STERILE WATER FOR IRRIGATION, USP AND APPLICATOR.: Brand: IRRISEPT ANTIMICROBIAL WOUND LAVAGE

## (undated) DEVICE — PADDING CAST W2INXL4YD ST COT COHESIVE HND TEARABLE SPEC

## (undated) DEVICE — BIPOLAR SEALER 23-112-1 AQM 6.0: Brand: AQUAMANTYS ®

## (undated) DEVICE — STRYKER PERFORMANCE SERIES SAGITTAL BLADE: Brand: STRYKER PERFORMANCE SERIES

## (undated) DEVICE — TOTAL KNEE DR JENNINGS: Brand: MEDLINE INDUSTRIES, INC.

## (undated) DEVICE — SOLUTION IRRIG 3000ML 0.9% SOD CHL USP UROMATIC PLAS CONT

## (undated) DEVICE — BANDAGE,GAUZE,CONFORMING,2"X75",STRL,LF: Brand: MEDLINE INDUSTRIES, INC.

## (undated) DEVICE — 3M™ IOBAN™ 2 ANTIMICROBIAL INCISE DRAPE 6650EZ: Brand: IOBAN™ 2

## (undated) DEVICE — KIT INT FIX FEM TIB CKPT MAKOPLASTY

## (undated) DEVICE — KIT TRK KNEE PROC VIZADISC

## (undated) DEVICE — GUIDEPIN ORTHOPEDIC NAVIGATION 4X140 MM 2P SCREW STRL

## (undated) DEVICE — GLOVE SURG SZ 7 L11.33IN FNGR THK9.8MIL STRW LTX POLYMER

## (undated) DEVICE — SYRINGE MED 50ML LUERLOCK TIP

## (undated) DEVICE — DRESSING HYDROFIBER AQUACEL AG ADVANTAGE 3.5X12 IN

## (undated) DEVICE — SUTURE ABSRB X-1 REV CUT 1/2 CIR 22MM UD BRAID 27IN SZ 3-0 J458H

## (undated) DEVICE — PIN BNE FIX TEMP L140MM DIA4MM MAKO

## (undated) DEVICE — BLADE RMR L46MM PAT PILOT H

## (undated) DEVICE — SUTURE VCRL SZ 0 L36IN ABSRB UD L36MM CT-1 1/2 CIR J946H

## (undated) DEVICE — SOLUTION IRRIG 1000ML 0.9% SOD CHL USP POUR PLAS BTL

## (undated) DEVICE — GLOVE SURG SZ 85 L12IN FNGR THK79MIL GRN LTX FREE

## (undated) DEVICE — PRECISION THIN, OFFSET (5.5 X 0.38 X 25.0MM)

## (undated) DEVICE — SUTURE ETHBND EXCEL SZ 2 L30IN NONABSORBABLE GRN L75MM LR X496T

## (undated) DEVICE — SUTURE VCRL SZ 2-0 L27IN ABSRB UD L26MM CT-2 1/2 CIR J269H

## (undated) DEVICE — BANDAGE COBAN 4 IN COMPR W4INXL5YD FOAM COHESIVE QUIK STK SELF ADH SFT

## (undated) DEVICE — DRSG GZ PETROLATM OCL 3X18IN -- CURAD